# Patient Record
Sex: MALE | Race: WHITE | NOT HISPANIC OR LATINO | Employment: OTHER | URBAN - METROPOLITAN AREA
[De-identification: names, ages, dates, MRNs, and addresses within clinical notes are randomized per-mention and may not be internally consistent; named-entity substitution may affect disease eponyms.]

---

## 2017-01-16 RX ORDER — FUROSEMIDE 20 MG/1
TABLET ORAL
Qty: 90 TAB | Refills: 0 | Status: SHIPPED | OUTPATIENT
Start: 2017-01-16 | End: 2017-04-24 | Stop reason: SDUPTHER

## 2017-01-16 NOTE — TELEPHONE ENCOUNTER
Was the patient seen in the last year in this department? Yes     Does patient have an active prescription for medications requested? No     Received Request Via: Pharmacy      Pt met protocol?: Yes  OV 12/27/16  BP Readings from Last 1 Encounters:   12/27/16 128/40

## 2017-01-30 ENCOUNTER — HOSPITAL ENCOUNTER (OUTPATIENT)
Dept: RADIOLOGY | Facility: MEDICAL CENTER | Age: 82
End: 2017-01-30
Attending: INTERNAL MEDICINE
Payer: MEDICARE

## 2017-01-30 DIAGNOSIS — R07.9 CHEST PAIN, UNSPECIFIED: ICD-10-CM

## 2017-01-30 PROCEDURE — 700111 HCHG RX REV CODE 636 W/ 250 OVERRIDE (IP)

## 2017-01-30 PROCEDURE — A9502 TC99M TETROFOSMIN: HCPCS

## 2017-01-30 RX ORDER — REGADENOSON 0.08 MG/ML
INJECTION, SOLUTION INTRAVENOUS
Status: COMPLETED
Start: 2017-01-30 | End: 2017-01-30

## 2017-01-30 RX ADMIN — REGADENOSON 0.4 MG: 0.08 INJECTION, SOLUTION INTRAVENOUS at 09:09

## 2017-02-06 DIAGNOSIS — I10 ESSENTIAL HYPERTENSION, BENIGN: Chronic | ICD-10-CM

## 2017-02-06 RX ORDER — AMLODIPINE BESYLATE 2.5 MG/1
TABLET ORAL
Qty: 90 TAB | Refills: 1 | Status: SHIPPED | OUTPATIENT
Start: 2017-02-06 | End: 2017-08-02 | Stop reason: SDUPTHER

## 2017-02-06 NOTE — TELEPHONE ENCOUNTER
Refill X 6 months, sent to pharmacy.Pt. Seen in the last 6 months per protocol.   Lab Results   Component Value Date/Time    SODIUM 138 10/20/2016 09:54 AM    SODIUM 140 10/20/2016 09:54 AM    POTASSIUM 4.8 10/20/2016 09:54 AM    POTASSIUM 4.8 10/20/2016 09:54 AM    CHLORIDE 108 10/20/2016 09:54 AM    CHLORIDE 107 10/20/2016 09:54 AM    CO2 25 10/20/2016 09:54 AM    CO2 26 10/20/2016 09:54 AM    GLUCOSE 118* 10/20/2016 09:54 AM    GLUCOSE 119* 10/20/2016 09:54 AM    BUN 22 10/20/2016 09:54 AM    BUN 23* 10/20/2016 09:54 AM    CREATININE 1.77* 10/20/2016 09:54 AM    CREATININE 1.75* 10/20/2016 09:54 AM

## 2017-02-14 RX ORDER — VALSARTAN 160 MG/1
TABLET ORAL
Qty: 90 TAB | Refills: 0 | Status: SHIPPED | OUTPATIENT
Start: 2017-02-14 | End: 2017-05-18 | Stop reason: SDUPTHER

## 2017-02-14 NOTE — TELEPHONE ENCOUNTER
Was the patient seen in the last year in this department? Yes     Does patient have an active prescription for medications requested? No     Received Request Via: Pharmacy      Pt met protocol?: Yes, LABS 10/16, OV 12/16 /40

## 2017-02-14 NOTE — TELEPHONE ENCOUNTER
Refill X 3 months, sent to pharmacy.Pt. Seen in the last 6 months per protocol.   Lab Results   Component Value Date/Time    SODIUM 138 10/20/2016 09:54 AM    SODIUM 140 10/20/2016 09:54 AM    POTASSIUM 4.8 10/20/2016 09:54 AM    POTASSIUM 4.8 10/20/2016 09:54 AM    CHLORIDE 108 10/20/2016 09:54 AM    CHLORIDE 107 10/20/2016 09:54 AM    CO2 25 10/20/2016 09:54 AM    CO2 26 10/20/2016 09:54 AM    GLUCOSE 118* 10/20/2016 09:54 AM    GLUCOSE 119* 10/20/2016 09:54 AM    BUN 22 10/20/2016 09:54 AM    BUN 23* 10/20/2016 09:54 AM    CREATININE 1.77* 10/20/2016 09:54 AM    CREATININE 1.75* 10/20/2016 09:54 AM

## 2017-03-07 RX ORDER — PRAVASTATIN SODIUM 10 MG
TABLET ORAL
Qty: 90 TAB | Refills: 1 | Status: SHIPPED | OUTPATIENT
Start: 2017-03-07 | End: 2017-09-05 | Stop reason: SDUPTHER

## 2017-03-07 NOTE — TELEPHONE ENCOUNTER
Last seen by PCP 12/16. Will send 6 months to pharmacy.  Lab Results   Component Value Date/Time    CHOLESTEROL, 10/20/2016 09:54 AM    CHOLESTEROL, 10/20/2016 09:54 AM    LDL 60 10/20/2016 09:54 AM    LDL 60 10/20/2016 09:54 AM    HDL 42 10/20/2016 09:54 AM    HDL 41 10/20/2016 09:54 AM    TRIGLYCERIDES 162* 10/20/2016 09:54 AM    TRIGLYCERIDES 163* 10/20/2016 09:54 AM       Lab Results   Component Value Date/Time    SODIUM 138 10/20/2016 09:54 AM    SODIUM 140 10/20/2016 09:54 AM    POTASSIUM 4.8 10/20/2016 09:54 AM    POTASSIUM 4.8 10/20/2016 09:54 AM    CHLORIDE 108 10/20/2016 09:54 AM    CHLORIDE 107 10/20/2016 09:54 AM    CO2 25 10/20/2016 09:54 AM    CO2 26 10/20/2016 09:54 AM    GLUCOSE 118* 10/20/2016 09:54 AM    GLUCOSE 119* 10/20/2016 09:54 AM    BUN 22 10/20/2016 09:54 AM    BUN 23* 10/20/2016 09:54 AM    CREATININE 1.77* 10/20/2016 09:54 AM    CREATININE 1.75* 10/20/2016 09:54 AM     Lab Results   Component Value Date/Time    ALKALINE PHOSPHATASE 52 10/20/2016 09:54 AM    ALKALINE PHOSPHATASE 54 10/20/2016 09:54 AM    AST(SGOT) 21 10/20/2016 09:54 AM    AST(SGOT) 19 10/20/2016 09:54 AM    ALT(SGPT) 21 10/20/2016 09:54 AM    ALT(SGPT) 20 10/20/2016 09:54 AM    TOTAL BILIRUBIN 0.6 10/20/2016 09:54 AM    TOTAL BILIRUBIN 0.6 10/20/2016 09:54 AM

## 2017-03-22 ENCOUNTER — PATIENT OUTREACH (OUTPATIENT)
Dept: HEALTH INFORMATION MANAGEMENT | Facility: OTHER | Age: 82
End: 2017-03-22

## 2017-03-22 NOTE — PROGRESS NOTES
3/22/17  -   Attempt #:1    Verify PCP: yes    Communication Preference Obtained: no     Annual Wellness Visit Scheduling  1. Scheduling Status:Scheduled     Care Gap Scheduling (Attempt to Schedule EACH Overdue Care Gap!)     Health Maintenance Due   Topic Date Due   • Annual Wellness Visit  Scheduled   • IMM DTaP/Tdap/Td Vaccine (1 - Tdap) Unable to discuss Immunizations   • IMM ZOSTER VACCINE     • IMM PNEUMOCOCCAL 65+ (ADULT) LOW/MEDIUM RISK SERIES (2 of 2 - PPSV23)          MyChart Activation: declined  Unable to complete new member, pt and wife busy.

## 2017-04-10 ENCOUNTER — TELEPHONE (OUTPATIENT)
Dept: MEDICAL GROUP | Facility: PHYSICIAN GROUP | Age: 82
End: 2017-04-10

## 2017-04-10 NOTE — TELEPHONE ENCOUNTER
1. Caller Name: Rajwinder/wife                                         Call Back Number: [phone] (home)         Patient approves a detailed voicemail message: N\A    Rajwinder states she found a scooter at [facility] that is mobil and foldable.  She is asking for an order she can  and take to [facility].

## 2017-04-12 NOTE — TELEPHONE ENCOUNTER
The scooters typically require a mobility assessment for the insurance to pay for it.  This can be done through OT.  I don't see that this was done.  Does the family want to start that process?

## 2017-04-19 ENCOUNTER — OFFICE VISIT (OUTPATIENT)
Dept: MEDICAL GROUP | Facility: PHYSICIAN GROUP | Age: 82
End: 2017-04-19
Payer: MEDICARE

## 2017-04-19 VITALS
TEMPERATURE: 98.4 F | HEIGHT: 70 IN | HEART RATE: 58 BPM | OXYGEN SATURATION: 96 % | BODY MASS INDEX: 31.5 KG/M2 | SYSTOLIC BLOOD PRESSURE: 120 MMHG | RESPIRATION RATE: 16 BRPM | DIASTOLIC BLOOD PRESSURE: 68 MMHG | WEIGHT: 220 LBS

## 2017-04-19 DIAGNOSIS — C44.319 BASAL CELL CARCINOMA OF BROW: ICD-10-CM

## 2017-04-19 DIAGNOSIS — I10 ESSENTIAL HYPERTENSION, BENIGN: Chronic | ICD-10-CM

## 2017-04-19 DIAGNOSIS — N18.30 CKD (CHRONIC KIDNEY DISEASE) STAGE 3, GFR 30-59 ML/MIN (HCC): ICD-10-CM

## 2017-04-19 DIAGNOSIS — E78.5 HYPERLIPIDEMIA, UNSPECIFIED HYPERLIPIDEMIA TYPE: Chronic | ICD-10-CM

## 2017-04-19 DIAGNOSIS — F10.10 EXCESSIVE DRINKING ALCOHOL: ICD-10-CM

## 2017-04-19 DIAGNOSIS — D53.9 MACROCYTIC ANEMIA: ICD-10-CM

## 2017-04-19 DIAGNOSIS — R79.89 ELEVATED LFTS: ICD-10-CM

## 2017-04-19 PROCEDURE — 99999 PR NO CHARGE: CPT | Performed by: NURSE PRACTITIONER

## 2017-04-19 PROCEDURE — G8432 DEP SCR NOT DOC, RNG: HCPCS | Performed by: NURSE PRACTITIONER

## 2017-04-19 PROCEDURE — 1036F TOBACCO NON-USER: CPT | Performed by: NURSE PRACTITIONER

## 2017-04-19 PROCEDURE — G0439 PPPS, SUBSEQ VISIT: HCPCS | Performed by: NURSE PRACTITIONER

## 2017-04-19 ASSESSMENT — PAIN SCALES - GENERAL: PAINLEVEL: 1=MINIMAL PAIN

## 2017-04-19 ASSESSMENT — PATIENT HEALTH QUESTIONNAIRE - PHQ9: CLINICAL INTERPRETATION OF PHQ2 SCORE: 0

## 2017-04-19 NOTE — MR AVS SNAPSHOT
"        Lamont Lynch   2017 10:00 AM   Office Visit   MRN: [id number]    Department:  [facility]   Dept Phone:  [phone]    Description:  Male : 1930   Provider:  MELISSA Chan; [facility]            Reason for Visit     Annual Wellness Visit           Allergies as of 2017     Allergen Noted Reactions    Nkda [No Known Drug Allergy] 10/25/2012         You were diagnosed with     BMI 31.0-31.9,adult   [198423]       CKD (chronic kidney disease) stage 3, GFR 30-59 ml/min   [770408]       Essential hypertension, benign   [401.1.ICD-9-CM]       Elevated LFTs   [345554]       Excessive drinking alcohol   [600720]       Hyperlipidemia, unspecified hyperlipidemia type   [7210727]       Macrocytic anemia   [462456]       Basal cell carcinoma of brow   [441182]         Vital Signs     Blood Pressure Pulse Temperature Respirations Height Weight    120/68 mmHg 58 36.9 °C (98.4 °F) 16 1.778 m (5' 10\") 99.791 kg (220 lb)    Body Mass Index Oxygen Saturation Smoking Status             31.57 kg/m2 96% Former Smoker         Basic Information     Date Of Birth Sex Race Ethnicity Preferred Language    1930 Male White Non- English      Your appointments     2017 10:40 AM   Established Patient with MELISSA Chan   [address] [id number] [phone]           You will be receiving a confirmation call a few days before your appointment from our automated call confirmation system.              Problem List              ICD-10-CM Priority Class Noted - Resolved    CAD (coronary artery disease) I25.10 High  2011 - Present    Essential hypertension, benign (Chronic) I10 Medium  2011 - Present    CKD (chronic kidney disease) stage 3, GFR 30-59 ml/min N18.3 High  2011 - Present    Hyperlipidemia (Chronic) E78.5 Medium  2011 - Present    Elevated LFTs " R79.89 High  12/30/2013 - Present    Excessive drinking alcohol F10.10 High  12/30/2013 - Present    Basal cell carcinoma of brow C44.319 Medium Chronic 9/2/2014 - Present    BMI 31.0-31.9,adult Z68.31   2/25/2015 - Present    Macrocytic anemia D53.9   4/22/2015 - Present      Health Maintenance        Date Due Completion Dates    IMM PNEUMOCOCCAL 65+ (ADULT) LOW/MEDIUM RISK SERIES (2 of 2 - PPSV23) 8/1/2017 (Originally 3/1/2017) 3/1/2016    IMM ZOSTER VACCINE 5/1/2018 (Originally 4/6/1990) ---    IMM DTaP/Tdap/Td Vaccine (1 - Tdap) 5/14/2018 (Originally [birth year]1949) ---    COLONOSCOPY 11/1/2020 11/1/2010 (N/S)    Override on 11/1/2010: (N/S)            Current Immunizations     13-VALENT PCV PREVNAR 3/1/2016    Influenza Vaccine Adult HD 12/27/2016    Influenza Vaccine Quad Inj (Preserved) 1/8/2015  3:06 PM      Below and/or attached are the medications your provider expects you to take. Review all of your home medications and newly ordered medications with your provider and/or pharmacist. Follow medication instructions as directed by your provider and/or pharmacist. Please keep your medication list with you and share with your provider. Update the information when medications are discontinued, doses are changed, or new medications (including over-the-counter products) are added; and carry medication information at all times in the event of emergency situations     Allergies:  NKDA - (reactions not documented)               Medications  Valid as of: April 19, 2017 - 11:34 AM    Generic Name Brand Name Tablet Size Instructions for use    AmLODIPine Besylate (Tab) NORVASC 2.5 MG TAKE ONE TABLET BY MOUTH ONCE DAILY        Clopidogrel Bisulfate (Tab) PLAVIX 75 MG TAKE ONE TABLET BY MOUTH ONCE DAILY        Ezetimibe (Tab) ZETIA 10 MG TAKE ONE TABLET BY MOUTH ONCE DAILY        Furosemide (Tab) LASIX 20 MG TAKE ONE TABLET BY MOUTH ONCE DAILY        Homeopathic Products   by Ophthalmic route 3 times a day.        Multiple  Vitamins-Minerals   Take  by mouth every day.        Nitroglycerin (SL Tab) NITROSTAT 0.4 MG Place 0.4 mg under tongue every 5 minutes as needed.        Non Formulary Request Non Formulary Request  Systane eye gtts, besivance eye gtts, tradnisolone eye gtts, ketorolac eye gtts two to three times a day        Omega-3 Fatty Acids (Cap) OMEGA 3 FA 1000 MG Take 1,000 mg by mouth 2 Times a Day.        Omeprazole (CAPSULE DELAYED RELEASE) PRILOSEC 20 MG One 1/2 hr before dinner        Pravastatin Sodium (Tab) PRAVACHOL 10 MG TAKE ONE TABLET BY MOUTH ONCE DAILY        Triamcinolone Acetonide (Cream) KENALOG 0.1 % Apply  Topically to affected area twice daily        Valsartan (Tab) DIOVAN 160 MG TAKE ONE TABLET BY MOUTH ONCE DAILY IN THE MORNING        .                 Medicines prescribed today were sent to:     [facility] [address] [place] [id number]    Phone: [phone] Fax: [phone]    Open 24 Hours?: No      Medication refill instructions:       If your prescription bottle indicates you have medication refills left, it is not necessary to call your provider’s office. Please contact your pharmacy and they will refill your medication.    If your prescription bottle indicates you do not have any refills left, you may request refills at any time through one of the following ways: The online [employer] system (except Urgent Care), by calling your provider’s office, or by asking your pharmacy to contact your provider’s office with a refill request. Medication refills are processed only during regular business hours and may not be available until the next business day. Your provider may request additional information or to have a follow-up visit with you prior to refilling your medication.   *Please Note: Medication refills are assigned a new Rx number when refilled electronically. Your pharmacy may indicate that no refills were authorized even though a new  prescription for the same medication is available at the pharmacy. Please request the medicine by name with the pharmacy before contacting your provider for a refill.        Other Notes About Your Plan     Pt prefers to be called Alan Herrera Status: Patient Declined

## 2017-04-19 NOTE — PROGRESS NOTES
Chief Complaint   Patient presents with   • Annual Wellness Visit         HPI:  Lamont is a 87 y.o. male here for Medicare Annual Wellness Visit        Patient Active Problem List    Diagnosis Date Noted   • Elevated LFTs 12/30/2013     Priority: High   • Excessive drinking alcohol 12/30/2013     Priority: High   • CAD (coronary artery disease) 06/02/2011     Priority: High   • CKD (chronic kidney disease) stage 3, GFR 30-59 ml/min 06/02/2011     Priority: High   • Basal cell carcinoma of brow 09/02/2014     Priority: Medium     Class: Chronic   • Hyperlipidemia 12/01/2011     Priority: Medium   • Essential hypertension, benign 06/02/2011     Priority: Medium   • Macrocytic anemia 04/22/2015   • BMI 31.0-31.9,adult 02/25/2015       Current Outpatient Prescriptions   Medication Sig Dispense Refill   • pravastatin (PRAVACHOL) 10 MG Tab TAKE ONE TABLET BY MOUTH ONCE DAILY 90 Tab 1   • valsartan (DIOVAN) 160 MG Tab TAKE ONE TABLET BY MOUTH ONCE DAILY IN THE MORNING 90 Tab 0   • amlodipine (NORVASC) 2.5 MG Tab TAKE ONE TABLET BY MOUTH ONCE DAILY 90 Tab 1   • furosemide (LASIX) 20 MG Tab TAKE ONE TABLET BY MOUTH ONCE DAILY 90 Tab 0   • triamcinolone acetonide (KENALOG) 0.1 % Cream Apply  Topically to affected area twice daily 1 Tube 2   • ezetimibe (ZETIA) 10 MG Tab TAKE ONE TABLET BY MOUTH ONCE DAILY 90 Tab 1   • clopidogrel (PLAVIX) 75 MG Tab TAKE ONE TABLET BY MOUTH ONCE DAILY 90 Tab 1   • Non Formulary Request Systane eye gtts, besivance eye gtts, tradnisolone eye gtts, ketorolac eye gtts two to three times a day     • nitroglycerin (NITROSTAT) 0.4 MG SUBL Place 0.4 mg under tongue every 5 minutes as needed.     • omeprazole (PRILOSEC) 20 MG CPDR One 1/2 hr before dinner 30 Cap 0   • Multiple Vitamins-Minerals (CENTRUM SILVER PO) Take  by mouth every day.     • docosahexanoic acid (FISH OIL) 1000 MG CAPS Take 1,000 mg by mouth 2 Times a Day.     • Homeopathic Products (SIMILASAN ALLERGY EYE RELIEF OP) by Ophthalmic  route 3 times a day.       No current facility-administered medications for this visit.        Patient is taking medications as noted in medication list.  Current supplements as per medication list.   Chronic narcotic pain medicines: no    Allergies: Nkda    Current social contact/activities: spend time with family     Is patient current with immunizations?  No, due for pneumonia. Patient is interested in receiving NONE today.     He  reports that he quit smoking about 44 years ago. His smoking use included Cigarettes. He has a 10 pack-year smoking history. He has never used smokeless tobacco. He reports that he does not drink alcohol or use illicit drugs.  Counseling given: Not Answered        DPA/Advanced Directive:  Patient has Advanced Directive, but it is not on file. Instructed to bring in a copy to scan into their chart.    ROS:    Gait: Uses a cane   Ostomy: no   Other tubes: no   Amputations: no   Chronic oxygen use no   Last eye exam 4/2015   Wears hearing aids: no   : Denies incontinence.       Depression Screening    Little interest or pleasure in doing things?  0 - not at all  Feeling down, depressed, or hopeless?  0 - not at all  Patient Health Questionnaire Score: 0  If depressive symptoms identified deferred to follow up visit unless specifically addressed in assessment and plan.    Screening for Cognitive Impairment    Three Minute Recall (banana, sunrise, fence)  2/3    Draw clock face with all 12 numbers set to the hand to show 10 minutes past 11 o'clock    5/5  If cognitive concerns identified deferred to follow up visit unless specifically addressed in assessment and plan.    Fall Risk Assessment    Has the patient had two or more falls in the last year or any fall with injury in the last year?  No  If Fall Risk identified deferred to follow up visit unless specifically addressed in assessment and plan.    Safety Assessment    Throw rugs on floor.  Yes  Handrails on all stairs.  Yes  Good  lighting in all hallways.  Yes  Difficulty hearing.  Yes  Patient counseled about all safety risks that were identified.    Functional Assessment ADLs    Are there any barriers preventing you from cooking for yourself or meeting nutritional needs?  No.    Are there any barriers preventing you from driving safely or obtaining transportation?  No.    Are there any barriers preventing you from using a telephone or calling for help?  No.    Are there any barriers preventing you from shopping?  No.    Are there any barriers preventing you from taking care of your own finances?  No.    Are there any barriers preventing you from managing your medications?  No.    Are currently engaging any exercise or physical activity?  No.       Health Maintenance Summary                Annual Wellness Visit Overdue 4/6/1930     IMM PNEUMOCOCCAL 65+ (ADULT) LOW/MEDIUM RISK SERIES Postponed 8/1/2017 Originally 3/1/2017. Patient Refused     Done 3/1/2016 Imm Admin: Pneumococcal Conjugate Vaccine (Prevnar/PCV-13)    IMM ZOSTER VACCINE Postponed 5/1/2018 Originally 4/6/1990. Patient Refused    IMM DTaP/Tdap/Td Vaccine Postponed 5/14/2018 Originally 4/6/1949. Patient Refused    COLONOSCOPY Next Due 11/1/2020      Not specified 11/1/2010           Patient Care Team:  MELISSA Chan as PCP - General (Family Medicine)  Fadi Najjar, M.D. as Consulting Physician (Nephrology)  Fei Zapata M.D. as Consulting Physician (Ophthalmology)  Rao Mendes M.D. as Consulting Physician (Cardiology)  Aliya Mcdowell M.D. as Consulting Physician (Dermatology)    Social History   Substance Use Topics   • Smoking status: Former Smoker -- 0.50 packs/day for 20 years     Types: Cigarettes     Quit date: 10/25/1972   • Smokeless tobacco: Never Used   • Alcohol Use: No      Comment: quit 5/2015     History reviewed. No pertinent family history.  He  has a past medical history of CAD (coronary artery disease); Stented coronary artery; and  "BMI 31.0-31.9,adult (2/25/2015).   Past Surgical History   Procedure Laterality Date   • Cataract phaco with iol  8/26/2014     Performed by Fei Zapata M.D. at [facility]   • Cataract phaco with iol  9/23/2014     Performed by Fei Zapata M.D. at [facility]   • Angioplasty balloon     • Eye surgery     • Appendectomy             Exam:     Blood pressure 120/68, pulse 58, temperature 36.9 °C (98.4 °F), resp. rate 16, height 1.778 m (5' 10\"), weight 99.791 kg (220 lb), SpO2 96 %. Body mass index is 31.57 kg/(m^2).    Hearing fair.    Dentition multiple carries  Alert, oriented in no acute distress.  Eye contact is good, speech goal directed, affect calm      Assessment and Plan. The following treatment and monitoring plan is recommended:    1. BMI 31.0-31.9,adult     2. CKD (chronic kidney disease) stage 3, GFR 30-59 ml/min     3. Essential hypertension, benign     4. Elevated LFTs     5. Excessive drinking alcohol     6. Hyperlipidemia, unspecified hyperlipidemia type     7. Macrocytic anemia     8. Basal cell carcinoma of brow           Services suggested: No services needed at this time  Health Care Screening recommendations as per orders if indicated.  Referrals offered: PT/OT/Nutrition counseling/Behavioral Health/Smoking cessation as per orders if indicated.    Discussion today about general wellness and lifestyle habits:    · Prevent falls and reduce trip hazards; Cautioned about securing or removing rugs.  · Have a working fire alarm and carbon monoxide detector;   · Engage in regular physical activity and social activities       Follow-up: Return in about 1 week (around 4/26/2017) for HTN/Lipid.  "

## 2017-04-25 RX ORDER — FUROSEMIDE 20 MG/1
TABLET ORAL
Qty: 90 TAB | Refills: 0 | Status: SHIPPED | OUTPATIENT
Start: 2017-04-25 | End: 2017-08-02 | Stop reason: SDUPTHER

## 2017-04-25 NOTE — TELEPHONE ENCOUNTER
Was the patient seen in the last year in this department? Yes     Does patient have an active prescription for medications requested? No     Received Request Via: Pharmacy      Pt met protocol?: Yes    LAST OV 04/19/2017

## 2017-04-26 ENCOUNTER — OFFICE VISIT (OUTPATIENT)
Dept: MEDICAL GROUP | Facility: PHYSICIAN GROUP | Age: 82
End: 2017-04-26
Payer: MEDICARE

## 2017-04-26 VITALS
WEIGHT: 221.6 LBS | BODY MASS INDEX: 31.73 KG/M2 | TEMPERATURE: 98.6 F | RESPIRATION RATE: 16 BRPM | OXYGEN SATURATION: 98 % | DIASTOLIC BLOOD PRESSURE: 56 MMHG | SYSTOLIC BLOOD PRESSURE: 126 MMHG | HEART RATE: 56 BPM | HEIGHT: 70 IN

## 2017-04-26 DIAGNOSIS — R05.3 CHRONIC COUGH: ICD-10-CM

## 2017-04-26 PROCEDURE — 99213 OFFICE O/P EST LOW 20 MIN: CPT | Performed by: NURSE PRACTITIONER

## 2017-04-26 PROCEDURE — G8419 CALC BMI OUT NRM PARAM NOF/U: HCPCS | Performed by: NURSE PRACTITIONER

## 2017-04-26 PROCEDURE — 1101F PT FALLS ASSESS-DOCD LE1/YR: CPT | Performed by: NURSE PRACTITIONER

## 2017-04-26 PROCEDURE — G8432 DEP SCR NOT DOC, RNG: HCPCS | Performed by: NURSE PRACTITIONER

## 2017-04-26 PROCEDURE — 1036F TOBACCO NON-USER: CPT | Performed by: NURSE PRACTITIONER

## 2017-04-26 PROCEDURE — 4040F PNEUMOC VAC/ADMIN/RCVD: CPT | Performed by: NURSE PRACTITIONER

## 2017-04-26 PROCEDURE — G8598 ASA/ANTIPLAT THER USED: HCPCS | Performed by: NURSE PRACTITIONER

## 2017-04-26 NOTE — ASSESSMENT & PLAN NOTE
Patient reports a chronic cough for several years. He is now experiencing a choking causing intermittent difficulty breathing. Choking can occur even when he is not eating or drinking, but he denies difficulty swallowing. He denies any fever or chills, ear pain or shortness.   Associated factors: environmental allergies with postnasal drip and has not started taking daily allergy medication.

## 2017-04-26 NOTE — MR AVS SNAPSHOT
"        Lamont Lynch   2017 10:40 AM   Office Visit   MRN: [id number]    Department:  [facility]   Dept Phone:  [phone]    Description:  Male : 1930   Provider:  MELISSA Chan           Reason for Visit     Cough w/ mucas       Allergies as of 2017     Allergen Noted Reactions    Nkda [No Known Drug Allergy] 10/25/2012         You were diagnosed with     Chronic cough   [304639]         Vital Signs     Blood Pressure Pulse Temperature Respirations Height Weight    126/56 mmHg 56 37 °C (98.6 °F) 16 1.778 m (5' 10\") 100.517 kg (221 lb 9.6 oz)    Body Mass Index Oxygen Saturation Smoking Status             31.80 kg/m2 98% Former Smoker         Basic Information     Date Of Birth Sex Race Ethnicity Preferred Language    1930 Male White Non- English      Problem List              ICD-10-CM Priority Class Noted - Resolved    CAD (coronary artery disease) I25.10 High  2011 - Present    Essential hypertension, benign (Chronic) I10 Medium  2011 - Present    CKD (chronic kidney disease) stage 3, GFR 30-59 ml/min N18.3 High  2011 - Present    Hyperlipidemia (Chronic) E78.5 Medium  2011 - Present    Elevated LFTs R79.89 High  2013 - Present    Excessive drinking alcohol F10.10 High  2013 - Present    Basal cell carcinoma of brow C44.319 Medium Chronic 2014 - Present    BMI 31.0-31.9,adult Z68.31   2015 - Present    Macrocytic anemia D53.9   2015 - Present    Chronic cough R05   2017 - Present      Health Maintenance        Date Due Completion Dates    IMM PNEUMOCOCCAL 65+ (ADULT) LOW/MEDIUM RISK SERIES (2 of 2 - PPSV23) 2017 (Originally 3/1/2017) 3/1/2016    IMM ZOSTER VACCINE 2018 (Originally 1990) ---    IMM DTaP/Tdap/Td Vaccine (1 - Tdap) 2018 (Originally 1949) ---    COLONOSCOPY 2020 (N/S)    Override on 2010: (N/S)            Current Immunizations     13-VALENT PCV " PREVNAR 3/1/2016    Influenza Vaccine Adult HD 12/27/2016    Influenza Vaccine Quad Inj (Preserved) 1/8/2015  3:06 PM      Below and/or attached are the medications your provider expects you to take. Review all of your home medications and newly ordered medications with your provider and/or pharmacist. Follow medication instructions as directed by your provider and/or pharmacist. Please keep your medication list with you and share with your provider. Update the information when medications are discontinued, doses are changed, or new medications (including over-the-counter products) are added; and carry medication information at all times in the event of emergency situations     Allergies:  NKDA - (reactions not documented)               Medications  Valid as of: April 26, 2017 - 11:07 AM    Generic Name Brand Name Tablet Size Instructions for use    AmLODIPine Besylate (Tab) NORVASC 2.5 MG TAKE ONE TABLET BY MOUTH ONCE DAILY        Clopidogrel Bisulfate (Tab) PLAVIX 75 MG TAKE ONE TABLET BY MOUTH ONCE DAILY        Ezetimibe (Tab) ZETIA 10 MG TAKE ONE TABLET BY MOUTH ONCE DAILY        Furosemide (Tab) LASIX 20 MG TAKE ONE TABLET BY MOUTH ONCE DAILY        Homeopathic Products   by Ophthalmic route 3 times a day.        Multiple Vitamins-Minerals   Take  by mouth every day.        Nitroglycerin (SL Tab) NITROSTAT 0.4 MG Place 0.4 mg under tongue every 5 minutes as needed.        Non Formulary Request Non Formulary Request  Systane eye gtts, besivance eye gtts, tradnisolone eye gtts, ketorolac eye gtts two to three times a day        Omega-3 Fatty Acids (Cap) OMEGA 3 FA 1000 MG Take 1,000 mg by mouth 2 Times a Day.        Omeprazole (CAPSULE DELAYED RELEASE) PRILOSEC 20 MG One 1/2 hr before dinner        Pravastatin Sodium (Tab) PRAVACHOL 10 MG TAKE ONE TABLET BY MOUTH ONCE DAILY        Triamcinolone Acetonide (Cream) KENALOG 0.1 % Apply  Topically to affected area twice daily        Valsartan (Tab) DIOVAN 160 MG TAKE  ONE TABLET BY MOUTH ONCE DAILY IN THE MORNING        .                 Medicines prescribed today were sent to:     [facility] [address] [place] [address], [place] - [address] [facility]    [address] [facility] [place] [address]    Phone: [phone] Fax: [phone]    Open 24 Hours?: No      Medication refill instructions:       If your prescription bottle indicates you have medication refills left, it is not necessary to call your provider’s office. Please contact your pharmacy and they will refill your medication.    If your prescription bottle indicates you do not have any refills left, you may request refills at any time through one of the following ways: The online Bulbstorm system (except Urgent Care), by calling your provider’s office, or by asking your pharmacy to contact your provider’s office with a refill request. Medication refills are processed only during regular business hours and may not be available until the next business day. Your provider may request additional information or to have a follow-up visit with you prior to refilling your medication.   *Please Note: Medication refills are assigned a new Rx number when refilled electronically. Your pharmacy may indicate that no refills were authorized even though a new prescription for the same medication is available at the pharmacy. Please request the medicine by name with the pharmacy before contacting your provider for a refill.        Referral     A referral request has been sent to our patient care coordination department. Please allow 3-5 business days for us to process this request and contact you either by phone or mail. If you do not hear from us by the 5th business day, please call us at [phone].        Other Notes About Your Plan     Pt prefers to be called Alan           [employer] Access Code: [email or web address]  Expires: 5/26/2017 11:07 AM    Bulbstorm  A secure, online tool to manage your health information     [employer]’s Bulbstorm® is a  secure, online tool that connects you to your personalized health information from the privacy of your home -- day or night - making it very easy for you to manage your healthcare. Once the activation process is completed, you can even access your medical information using the Jiberish leidy, which is available for free in the Apple Leidy store or Google Play store.     Jiberish provides the following levels of access (as shown below):   My Chart Features   [facility] Doctor [facility]  Specialists [facility]  Urgent  Care Non-[facility]  Primary Care  Doctor   Email your healthcare team securely and privately 24/7 X X X    Manage appointments: schedule your next appointment; view details of past/upcoming appointments X      Request prescription refills. X      View recent personal medical records, including lab and immunizations X X X X   View health record, including health history, allergies, medications X X X X   Read reports about your outpatient visits, procedures, consult and ER notes X X X X   See your discharge summary, which is a recap of your hospital and/or ER visit that includes your diagnosis, lab results, and care plan. X X       How to register for Jiberish:  1. Go to  [email or web address].  2. Click on the Sign Up Now box, which takes you to the New Member Sign Up page. You will need to provide the following information:  a. Enter your Jiberish Access Code exactly as it appears at the top of this page. (You will not need to use this code after you’ve completed the sign-up process. If you do not sign up before the expiration date, you must request a new code.)   b. Enter your date of birth.   c. Enter your home email address.   d. Click Submit, and follow the next screen’s instructions.  3. Create a YouLiket ID. This will be your Jiberish login ID and cannot be changed, so think of one that is secure and easy to remember.  4. Create a Jiberish password. You can change your password at any time.  5. Enter  your Password Reset Question and Answer. This can be used at a later time if you forget your password.   6. Enter your e-mail address. This allows you to receive e-mail notifications when new information is available in [employer].  7. Click Sign Up. You can now view your health information.    For assistance activating your [employer] account, call [phone]

## 2017-04-26 NOTE — PROGRESS NOTES
Subjective:     Chief Complaint   Patient presents with   • Cough     w/ mucas        HPI:  Lamont Lynch is a 87 y.o. male here today to discuss the following:    Chronic cough  Patient reports a chronic cough for several years. He is now experiencing a choking causing intermittent difficulty breathing. Choking can occur even when he is not eating or drinking, but he denies difficulty swallowing. He denies any fever or chills, ear pain or shortness.   Associated factors: environmental allergies with postnasal drip and has not started taking daily allergy medication.        Current medicines (including changes today)  Current Outpatient Prescriptions   Medication Sig Dispense Refill   • furosemide (LASIX) 20 MG Tab TAKE ONE TABLET BY MOUTH ONCE DAILY 90 Tab 0   • pravastatin (PRAVACHOL) 10 MG Tab TAKE ONE TABLET BY MOUTH ONCE DAILY 90 Tab 1   • valsartan (DIOVAN) 160 MG Tab TAKE ONE TABLET BY MOUTH ONCE DAILY IN THE MORNING 90 Tab 0   • amlodipine (NORVASC) 2.5 MG Tab TAKE ONE TABLET BY MOUTH ONCE DAILY 90 Tab 1   • triamcinolone acetonide (KENALOG) 0.1 % Cream Apply  Topically to affected area twice daily 1 Tube 2   • ezetimibe (ZETIA) 10 MG Tab TAKE ONE TABLET BY MOUTH ONCE DAILY 90 Tab 1   • clopidogrel (PLAVIX) 75 MG Tab TAKE ONE TABLET BY MOUTH ONCE DAILY 90 Tab 1   • Non Formulary Request Systane eye gtts, besivance eye gtts, tradnisolone eye gtts, ketorolac eye gtts two to three times a day     • nitroglycerin (NITROSTAT) 0.4 MG SUBL Place 0.4 mg under tongue every 5 minutes as needed.     • omeprazole (PRILOSEC) 20 MG CPDR One 1/2 hr before dinner 30 Cap 0   • Multiple Vitamins-Minerals (CENTRUM SILVER PO) Take  by mouth every day.     • docosahexanoic acid (FISH OIL) 1000 MG CAPS Take 1,000 mg by mouth 2 Times a Day.     • Homeopathic Products (SIMILASAN ALLERGY EYE RELIEF OP) by Ophthalmic route 3 times a day.       No current facility-administered medications for this visit.       He  has a  "past medical history of CAD (coronary artery disease); Stented coronary artery; and BMI 31.0-31.9,adult (2/25/2015).    ROS   Review of Systems   Constitutional: Negative for fever, chills, weight loss and malaise/fatigue.   HENT: Negative for ear pain, nosebleeds, congestion, sore throat and neck pain.    Respiratory: Negative for  sputum production, shortness of breath and wheezing.  Positive for cough  Cardiovascular: Negative for chest pain, palpitations,  and leg swelling.   Gastrointestinal: Negative for heartburn, nausea, vomiting, diarrhea and abdominal pain.   Neurological: Negative for dizziness, tingling, tremors, sensory change, focal weakness and headaches.   Psychiatric/Behavioral: Negative for depression, anxiety, suicidal ideas, insomnia and memory loss.    All other systems reviewed and are negative except as in HPI.     Objective:   Physical Exam:  Blood pressure 126/56, pulse 56, temperature 37 °C (98.6 °F), resp. rate 16, height 1.778 m (5' 10\"), weight 100.517 kg (221 lb 9.6 oz), SpO2 98 %. Body mass index is 31.8 kg/(m^2).   Physical Exam:  Alert, oriented in no acute distress.  Eye contact is good, speech goal directed, affect calm  HEENT: conjunctiva non-injected, sclera non-icteric.  Pinna normal. Oropharynx is clear with clear postnasal drip. Nasal passages are patent with clear rhinorrhea.  Neck No adenopathy or masses in the neck or supraclavicular regions.  Lungs: clear to auscultation bilaterally with good excursion.  CV: regular rate and rhythm.   MS: Normal gait and station    Assessment and Plan:   The following treatment plan was discussed   1. Chronic cough  REFERRAL TO GASTROENTEROLOGY       Followup: Return if symptoms worsen or fail to improve.   Please note that this dictation was created using voice recognition software. I have made every reasonable attempt to correct obvious errors, but I expect that there are errors of grammar and possibly content that I did not discover " before finalizing the note.

## 2017-05-10 ENCOUNTER — OFFICE VISIT (OUTPATIENT)
Dept: MEDICAL GROUP | Facility: PHYSICIAN GROUP | Age: 82
End: 2017-05-10
Payer: MEDICARE

## 2017-05-10 VITALS
BODY MASS INDEX: 31.58 KG/M2 | WEIGHT: 220.6 LBS | OXYGEN SATURATION: 97 % | TEMPERATURE: 97.5 F | SYSTOLIC BLOOD PRESSURE: 124 MMHG | HEIGHT: 70 IN | DIASTOLIC BLOOD PRESSURE: 72 MMHG | HEART RATE: 65 BPM | RESPIRATION RATE: 14 BRPM

## 2017-05-10 DIAGNOSIS — Z74.09 IMPAIRED MOBILITY: ICD-10-CM

## 2017-05-10 PROCEDURE — 4040F PNEUMOC VAC/ADMIN/RCVD: CPT | Performed by: NURSE PRACTITIONER

## 2017-05-10 PROCEDURE — G8432 DEP SCR NOT DOC, RNG: HCPCS | Performed by: NURSE PRACTITIONER

## 2017-05-10 PROCEDURE — G8419 CALC BMI OUT NRM PARAM NOF/U: HCPCS | Performed by: NURSE PRACTITIONER

## 2017-05-10 PROCEDURE — 99214 OFFICE O/P EST MOD 30 MIN: CPT | Performed by: NURSE PRACTITIONER

## 2017-05-10 PROCEDURE — G8598 ASA/ANTIPLAT THER USED: HCPCS | Performed by: NURSE PRACTITIONER

## 2017-05-10 PROCEDURE — 1101F PT FALLS ASSESS-DOCD LE1/YR: CPT | Performed by: NURSE PRACTITIONER

## 2017-05-10 PROCEDURE — 1036F TOBACCO NON-USER: CPT | Performed by: NURSE PRACTITIONER

## 2017-05-10 NOTE — PROGRESS NOTES
CC: Evaluation for power wheelchair mobility issues    Lamont Lynch is a 87 y.o. year old male who is having mobility issues in their home and use of a cane, walker, or manual wheelchair will not meet needs.    HPI:    Signs and symptoms that limit ambulation/mobility include: Muscle weakness due to CAD     Diagnosis that are responsible for the signs and symptoms CAD   Medications her other treatments prescribed for the signs and symptoms: Plavix, Pravachol, Zetia    Progression of mobility difficulty include: Limited to 200 feet due to lower extremity weakness and pain    Other diagnoses that may relate to ambulatory problems: CAD     History of falls:Yes    Cane or walker will not meet patient's mobility needs in the home for the following reasons:  Unsteady gait due to weakness and pain  Upper extremity and lower extremity strength of 3-4 /5   Poor balance or postural stability    Manual wheelchair will not meet patient's mobility needs in the home for the following reasons:  Decreased upper arm strength bilaterally   Decreased range of motion of both shoulders and hands due to degenerative disease  Pain level  10/10       A/P: Causing mobility issues in their home and use of the cane, walker or manual wheelchair will not meet needs    DX: Z74.09. Impaired mobility    Plan: Order mobility/power chair assessment  Referral done to physical therapy    Face to face visit with no other medical issues discussed at this visit.  Problem diagnoses causing impaired mobility include: CAD, DJD    MELISSA Chan

## 2017-05-10 NOTE — MR AVS SNAPSHOT
"        Lamont Lynch   5/10/2017 10:20 AM   Office Visit   MRN: [id number]    Department:  [facility]   Dept Phone:  [phone]    Description:  Male : 1930   Provider:  MELISSA Chan           Reason for Visit     Orders Needed Mobile Scooter       Allergies as of 5/10/2017     Allergen Noted Reactions    Nkda [No Known Drug Allergy] 10/25/2012         You were diagnosed with     Impaired mobility   [002392]         Vital Signs     Blood Pressure Pulse Temperature Respirations Height Weight    124/72 mmHg 65 36.4 °C (97.5 °F) 14 1.778 m (5' 10\") 100.064 kg (220 lb 9.6 oz)    Body Mass Index Oxygen Saturation Smoking Status             31.65 kg/m2 97% Former Smoker         Basic Information     Date Of Birth Sex Race Ethnicity Preferred Language    1930 Male White Non- English      Problem List              ICD-10-CM Priority Class Noted - Resolved    CAD (coronary artery disease) I25.10 High  2011 - Present    Essential hypertension, benign (Chronic) I10 Medium  2011 - Present    CKD (chronic kidney disease) stage 3, GFR 30-59 ml/min N18.3 High  2011 - Present    Hyperlipidemia (Chronic) E78.5 Medium  2011 - Present    Elevated LFTs R94.5 High  2013 - Present    Excessive drinking alcohol F10.10 High  2013 - Present    Basal cell carcinoma of brow C44.319 Medium Chronic 2014 - Present    BMI 31.0-31.9,adult Z68.31   2015 - Present    Macrocytic anemia D53.9   2015 - Present    Chronic cough R05   2017 - Present    Impaired mobility Z74.09   5/10/2017 - Present      Health Maintenance        Date Due Completion Dates    IMM PNEUMOCOCCAL 65+ (ADULT) LOW/MEDIUM RISK SERIES (2 of 2 - PPSV23) 2017 (Originally 3/1/2017) 3/1/2016    IMM ZOSTER VACCINE 2018 (Originally 1990) ---    IMM DTaP/Tdap/Td Vaccine (1 - Tdap) 2018 (Originally 1949) ---    COLONOSCOPY 2020 (N/S)    Override on " 11/1/2010: (N/S)            Current Immunizations     13-VALENT PCV PREVNAR 3/1/2016    Influenza Vaccine Adult HD 12/27/2016    Influenza Vaccine Quad Inj (Preserved) 1/8/2015  3:06 PM      Below and/or attached are the medications your provider expects you to take. Review all of your home medications and newly ordered medications with your provider and/or pharmacist. Follow medication instructions as directed by your provider and/or pharmacist. Please keep your medication list with you and share with your provider. Update the information when medications are discontinued, doses are changed, or new medications (including over-the-counter products) are added; and carry medication information at all times in the event of emergency situations     Allergies:  NKDA - (reactions not documented)               Medications  Valid as of: May 10, 2017 - 10:36 AM    Generic Name Brand Name Tablet Size Instructions for use    AmLODIPine Besylate (Tab) NORVASC 2.5 MG TAKE ONE TABLET BY MOUTH ONCE DAILY        Clopidogrel Bisulfate (Tab) PLAVIX 75 MG TAKE ONE TABLET BY MOUTH ONCE DAILY        Ezetimibe (Tab) ZETIA 10 MG TAKE ONE TABLET BY MOUTH ONCE DAILY        Furosemide (Tab) LASIX 20 MG TAKE ONE TABLET BY MOUTH ONCE DAILY        Homeopathic Products   by Ophthalmic route 3 times a day.        Multiple Vitamins-Minerals   Take  by mouth every day.        Nitroglycerin (SL Tab) NITROSTAT 0.4 MG Place 0.4 mg under tongue every 5 minutes as needed.        Non Formulary Request Non Formulary Request  Systane eye gtts, besivance eye gtts, tradnisolone eye gtts, ketorolac eye gtts two to three times a day        Omega-3 Fatty Acids (Cap) OMEGA 3 FA 1000 MG Take 1,000 mg by mouth 2 Times a Day.        Omeprazole (CAPSULE DELAYED RELEASE) PRILOSEC 20 MG One 1/2 hr before dinner        Pravastatin Sodium (Tab) PRAVACHOL 10 MG TAKE ONE TABLET BY MOUTH ONCE DAILY        Triamcinolone Acetonide (Cream) KENALOG 0.1 % Apply  Topically to  affected area twice daily        Valsartan (Tab) DIOVAN 160 MG TAKE ONE TABLET BY MOUTH ONCE DAILY IN THE MORNING        .                 Medicines prescribed today were sent to:     [facility] [address] [place] [address] [address] [facility]    [address] [facility] [address]    Phone: [phone] Fax: [phone]    Open 24 Hours?: No      Medication refill instructions:       If your prescription bottle indicates you have medication refills left, it is not necessary to call your provider’s office. Please contact your pharmacy and they will refill your medication.    If your prescription bottle indicates you do not have any refills left, you may request refills at any time through one of the following ways: The online PeriphaGen system (except Urgent Care), by calling your provider’s office, or by asking your pharmacy to contact your provider’s office with a refill request. Medication refills are processed only during regular business hours and may not be available until the next business day. Your provider may request additional information or to have a follow-up visit with you prior to refilling your medication.   *Please Note: Medication refills are assigned a new Rx number when refilled electronically. Your pharmacy may indicate that no refills were authorized even though a new prescription for the same medication is available at the pharmacy. Please request the medicine by name with the pharmacy before contacting your provider for a refill.        Referral     A referral request has been sent to our patient care coordination department. Please allow 3-5 business days for us to process this request and contact you either by phone or mail. If you do not hear from us by the 5th business day, please call us at [phone].        Other Notes About Your Plan     Pt prefers to be called Alan Herrera Access Code: [email or web address]  Expires: 5/26/2017 11:07 AM    PeriphaGen  A secure, online tool to  manage your health information     [employer]’s Neurelis® is a secure, online tool that connects you to your personalized health information from the privacy of your home -- day or night - making it very easy for you to manage your healthcare. Once the activation process is completed, you can even access your medical information using the Neurelis leidy, which is available for free in the Apple Leidy store or Google Play store.     Neurelis provides the following levels of access (as shown below):   My Chart Features   [facility] Primary [facility] Doctor [facility]  Specialists [facility]  Urgent  Care Non-[facility]  Primary Care  Doctor   Email your healthcare team securely and privately 24/7 X X X    Manage appointments: schedule your next appointment; view details of past/upcoming appointments X      Request prescription refills. X      View recent personal medical records, including lab and immunizations X X X X   View health record, including health history, allergies, medications X X X X   Read reports about your outpatient visits, procedures, consult and ER notes X X X X   See your discharge summary, which is a recap of your hospital and/or ER visit that includes your diagnosis, lab results, and care plan. X X       How to register for Neurelis:  1. Go to  [email or web address].  2. Click on the Sign Up Now box, which takes you to the New Member Sign Up page. You will need to provide the following information:  a. Enter your Neurelis Access Code exactly as it appears at the top of this page. (You will not need to use this code after you’ve completed the sign-up process. If you do not sign up before the expiration date, you must request a new code.)   b. Enter your date of birth.   c. Enter your home email address.   d. Click Submit, and follow the next screen’s instructions.  3. Create a Neurelis ID. This will be your Neurelis login ID and cannot be changed, so think of one that is secure and easy to remember.  4. Create a BullGuardt  password. You can change your password at any time.  5. Enter your Password Reset Question and Answer. This can be used at a later time if you forget your password.   6. Enter your e-mail address. This allows you to receive e-mail notifications when new information is available in [employer].  7. Click Sign Up. You can now view your health information.    For assistance activating your [employer] account, call [phone]

## 2017-05-18 RX ORDER — VALSARTAN 160 MG/1
TABLET ORAL
Qty: 90 TAB | Refills: 1 | Status: SHIPPED | OUTPATIENT
Start: 2017-05-18 | End: 2017-11-15 | Stop reason: SDUPTHER

## 2017-05-18 NOTE — TELEPHONE ENCOUNTER
Was the patient seen in the last year in this department? Yes     Does patient have an active prescription for medications requested? No     Received Request Via: Pharmacy      Pt met protocol?: Yes    LAST OV 05/10/2017    BP Readings from Last 1 Encounters:   05/10/17 124/72

## 2017-06-05 RX ORDER — CLOPIDOGREL BISULFATE 75 MG/1
TABLET ORAL
Qty: 90 TAB | Refills: 0 | Status: SHIPPED | OUTPATIENT
Start: 2017-06-05 | End: 2017-09-12 | Stop reason: SDUPTHER

## 2017-06-05 NOTE — TELEPHONE ENCOUNTER
Was the patient seen in the last year in this department? Yes     Does patient have an active prescription for medications requested? No     Received Request Via: Pharmacy      Pt met protocol?: Yes    LAST OV 05/10/2017

## 2017-06-22 RX ORDER — EZETIMIBE 10 MG/1
TABLET ORAL
Qty: 90 TAB | Refills: 0 | Status: SHIPPED | OUTPATIENT
Start: 2017-06-22 | End: 2017-09-25 | Stop reason: SDUPTHER

## 2017-06-23 ENCOUNTER — TELEPHONE (OUTPATIENT)
Dept: MEDICAL GROUP | Facility: PHYSICIAN GROUP | Age: 82
End: 2017-06-23

## 2017-06-26 ENCOUNTER — OFFICE VISIT (OUTPATIENT)
Dept: URGENT CARE | Facility: PHYSICIAN GROUP | Age: 82
End: 2017-06-26
Payer: MEDICARE

## 2017-06-26 VITALS
SYSTOLIC BLOOD PRESSURE: 112 MMHG | RESPIRATION RATE: 16 BRPM | BODY MASS INDEX: 31.5 KG/M2 | HEART RATE: 56 BPM | WEIGHT: 220 LBS | DIASTOLIC BLOOD PRESSURE: 66 MMHG | HEIGHT: 70 IN | TEMPERATURE: 98.8 F | OXYGEN SATURATION: 96 %

## 2017-06-26 DIAGNOSIS — S51.011A SKIN TEAR OF RIGHT ELBOW WITHOUT COMPLICATION, INITIAL ENCOUNTER: Primary | ICD-10-CM
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
## 2017-06-26 PROCEDURE — 99214 OFFICE O/P EST MOD 30 MIN: CPT | Performed by: PHYSICIAN ASSISTANT

## 2017-06-26 RX ORDER — MUPIROCIN CALCIUM 20 MG/G
CREAM TOPICAL
Qty: 1 TUBE | Refills: 2 | Status: SHIPPED | OUTPATIENT
Start: 2017-06-26 | End: 2019-09-03

## 2017-06-26 NOTE — MR AVS SNAPSHOT
"        Lamont Lynch   2017 5:00 PM   Office Visit   MRN: [id number]    Department:  [facility]   Dept Phone:  [phone]    Description:  Male : 1930   Provider:  Yi Alba PA-C           Reason for Visit     Arm Injury fall x 1 week, abrasion to the left elbow.        Allergies as of 2017     Allergen Noted Reactions    Nkda [No Known Drug Allergy] 10/25/2012         You were diagnosed with     Skin tear of right elbow without complication, initial encounter   [[id number]]         Vital Signs     Blood Pressure Pulse Temperature Respirations Height Weight    112/66 mmHg 56 37.1 °C (98.8 °F) 16 1.778 m (5' 10\") 99.791 kg (220 lb)    Body Mass Index Oxygen Saturation Smoking Status             31.57 kg/m2 96% Former Smoker         Basic Information     Date Of Birth Sex Race Ethnicity Preferred Language    1930 Male White Non- English      Problem List              ICD-10-CM Priority Class Noted - Resolved    CAD (coronary artery disease) I25.10 High  2011 - Present    Essential hypertension, benign (Chronic) I10 Medium  2011 - Present    CKD (chronic kidney disease) stage 3, GFR 30-59 ml/min N18.3 High  2011 - Present    Hyperlipidemia (Chronic) E78.5 Medium  2011 - Present    Elevated LFTs R94.5 High  2013 - Present    Excessive drinking alcohol F10.10 High  2013 - Present    Basal cell carcinoma of brow C44.319 Medium Chronic 2014 - Present    BMI 31.0-31.9,adult Z68.31   2015 - Present    Macrocytic anemia D53.9   2015 - Present    Chronic cough R05   2017 - Present    Impaired mobility Z74.09   5/10/2017 - Present      Health Maintenance        Date Due Completion Dates    IMM PNEUMOCOCCAL 65+ (ADULT) LOW/MEDIUM RISK SERIES (2 of 2 - PPSV23) 2017 (Originally 3/1/2017) 3/1/2016    IMM ZOSTER VACCINE 2018 (Originally 1990) ---    IMM DTaP/Tdap/Td Vaccine (1 - Tdap) 2018 (Originally 1949) ---  "    COLONOSCOPY 11/1/2020 11/1/2010 (N/S)    Override on 11/1/2010: (N/S)            Current Immunizations     13-VALENT PCV PREVNAR 3/1/2016    Influenza Vaccine Adult HD 12/27/2016    Influenza Vaccine Quad Inj (Preserved) 1/8/2015  3:06 PM      Below and/or attached are the medications your provider expects you to take. Review all of your home medications and newly ordered medications with your provider and/or pharmacist. Follow medication instructions as directed by your provider and/or pharmacist. Please keep your medication list with you and share with your provider. Update the information when medications are discontinued, doses are changed, or new medications (including over-the-counter products) are added; and carry medication information at all times in the event of emergency situations     Allergies:  NKDA - (reactions not documented)               Medications  Valid as of: June 26, 2017 -  6:53 PM    Generic Name Brand Name Tablet Size Instructions for use    AmLODIPine Besylate (Tab) NORVASC 2.5 MG TAKE ONE TABLET BY MOUTH ONCE DAILY        Clopidogrel Bisulfate (Tab) PLAVIX 75 MG TAKE ONE TABLET BY MOUTH ONCE DAILY        Ezetimibe (Tab) ZETIA 10 MG Take one tab by mouth once daily         Furosemide (Tab) LASIX 20 MG TAKE ONE TABLET BY MOUTH ONCE DAILY        Homeopathic Products   by Ophthalmic route 3 times a day.        Multiple Vitamins-Minerals   Take  by mouth every day.        Mupirocin Calcium (Cream) BACTROBAN 2 % Apply small amount to skin as directed 2 times daily as directed.        Nitroglycerin (SL Tab) NITROSTAT 0.4 MG Place 0.4 mg under tongue every 5 minutes as needed.        Non Formulary Request Non Formulary Request  Systane eye gtts, besivance eye gtts, tradnisolone eye gtts, ketorolac eye gtts two to three times a day        Omega-3 Fatty Acids (Cap) OMEGA 3 FA 1000 MG Take 1,000 mg by mouth 2 Times a Day.        Omeprazole (CAPSULE DELAYED RELEASE) PRILOSEC 20 MG One 1/2 hr  before dinner        Pravastatin Sodium (Tab) PRAVACHOL 10 MG TAKE ONE TABLET BY MOUTH ONCE DAILY        Triamcinolone Acetonide (Cream) KENALOG 0.1 % Apply  Topically to affected area twice daily        Valsartan (Tab) DIOVAN 160 MG TAKE ONE TABLET BY MOUTH ONCE DAILY IN THE MORNING        .                 Medicines prescribed today were sent to:     [facility] [address] [place] [address], [place] - [address] [facility]    [address] [facility] [place] [address]    Phone: [phone] Fax: [phone]    Open 24 Hours?: No      Medication refill instructions:       If your prescription bottle indicates you have medication refills left, it is not necessary to call your provider’s office. Please contact your pharmacy and they will refill your medication.    If your prescription bottle indicates you do not have any refills left, you may request refills at any time through one of the following ways: The online [employer] system (except Urgent Care), by calling your provider’s office, or by asking your pharmacy to contact your provider’s office with a refill request. Medication refills are processed only during regular business hours and may not be available until the next business day. Your provider may request additional information or to have a follow-up visit with you prior to refilling your medication.   *Please Note: Medication refills are assigned a new Rx number when refilled electronically. Your pharmacy may indicate that no refills were authorized even though a new prescription for the same medication is available at the pharmacy. Please request the medicine by name with the pharmacy before contacting your provider for a refill.        Instructions    Skin Tear Care  A skin tear is a wound in which the top layer of skin has peeled off. This is a common problem with aging because the skin becomes thinner and more fragile as a person gets older. In addition, some medicines, such as oral corticosteroids, can lead to skin  thinning if taken for long periods of time.   A skin tear is often repaired with tape or skin adhesive strips. This keeps the skin that has been peeled off in contact with the healthier skin beneath. Depending on the location of the wound, a bandage (dressing) may be applied over the tape or skin adhesive strips. Sometimes, during the healing process, the skin turns black and dies. Even when this happens, the torn skin acts as a good dressing until the skin underneath gets healthier and repairs itself.  HOME CARE INSTRUCTIONS   · Change dressings once per day or as directed by your caregiver.  ¨ Gently clean the skin tear and the area around the tear using saline solution or mild soap and water.  ¨ Do not rub the injured skin dry. Let the area air dry.  ¨ Apply petroleum jelly or an antibiotic cream or ointment to keep the tear moist. This will help the wound heal. Do not allow a scab to form.  ¨ If the dressing sticks before the next dressing change, moisten it with warm soapy water and gently remove it.  · Protect the injured skin until it has healed.  · Only take over-the-counter or prescription medicines as directed by your caregiver.  · Take showers or baths using warm soapy water. Apply a new dressing after the shower or bath.  · Keep all follow-up appointments as directed by your caregiver.    SEEK IMMEDIATE MEDICAL CARE IF:   · You have redness, swelling, or increasing pain in the skin tear.  · You have pus coming from the skin tear.  · You have chills.  · You have a red streak that goes away from the skin tear.  · You have a bad smell coming from the tear or dressing.  · You have a fever or persistent symptoms for more than 2-3 days.  · You have a fever and your symptoms suddenly get worse.  MAKE SURE YOU:  · Understand these instructions.  · Will watch this condition.  · Will get help right away if your child is not doing well or gets worse.     This information is not intended to replace advice given to  you by your health care provider. Make sure you discuss any questions you have with your health care provider.     Document Released: 09/12/2002 Document Revised: 09/11/2013 Document Reviewed: 07/01/2013  Elsevier Interactive Patient Education ©2016 [employer]         Other Notes About Your Plan     Pt prefers to be called Alan           Castlerock REO Access Code: [email or web address]  Expires: 7/26/2017  6:53 PM    Castlerock REO  A secure, online tool to manage your health information     [employer]’s Castlerock REO® is a secure, online tool that connects you to your personalized health information from the privacy of your home -- day or night - making it very easy for you to manage your healthcare. Once the activation process is completed, you can even access your medical information using the Castlerock REO leidy, which is available for free in the Apple Leidy store or Google Play store.     Castlerock REO provides the following levels of access (as shown below):   My Chart Features   [facility]  Specialists [facility]  Urgent  Care [facility]  Primary Care  Doctor   Email your healthcare team securely and privately 24/7 X X X    Manage appointments: schedule your next appointment; view details of past/upcoming appointments X      Request prescription refills. X      View recent personal medical records, including lab and immunizations X X X X   View health record, including health history, allergies, medications X X X X   Read reports about your outpatient visits, procedures, consult and ER notes X X X X   See your discharge summary, which is a recap of your hospital and/or ER visit that includes your diagnosis, lab results, and care plan. X X       How to register for Castlerock REO:  1. Go to  [email or web address].  2. Click on the Sign Up Now box, which takes you to the New Member Sign Up page. You will need to provide the following information:  a. Enter your Castlerock REO Access Code exactly as it appears at the top of this  page. (You will not need to use this code after you’ve completed the sign-up process. If you do not sign up before the expiration date, you must request a new code.)   b. Enter your date of birth.   c. Enter your home email address.   d. Click Submit, and follow the next screen’s instructions.  3. Create a MideoMe ID. This will be your MideoMe login ID and cannot be changed, so think of one that is secure and easy to remember.  4. Create a MideoMe password. You can change your password at any time.  5. Enter your Password Reset Question and Answer. This can be used at a later time if you forget your password.   6. Enter your e-mail address. This allows you to receive e-mail notifications when new information is available in MideoMe.  7. Click Sign Up. You can now view your health information.    For assistance activating your MideoMe account, call [phone]

## 2017-06-27 NOTE — PATIENT INSTRUCTIONS
Skin Tear Care  A skin tear is a wound in which the top layer of skin has peeled off. This is a common problem with aging because the skin becomes thinner and more fragile as a person gets older. In addition, some medicines, such as oral corticosteroids, can lead to skin thinning if taken for long periods of time.   A skin tear is often repaired with tape or skin adhesive strips. This keeps the skin that has been peeled off in contact with the healthier skin beneath. Depending on the location of the wound, a bandage (dressing) may be applied over the tape or skin adhesive strips. Sometimes, during the healing process, the skin turns black and dies. Even when this happens, the torn skin acts as a good dressing until the skin underneath gets healthier and repairs itself.  HOME CARE INSTRUCTIONS   · Change dressings once per day or as directed by your caregiver.  ¨ Gently clean the skin tear and the area around the tear using saline solution or mild soap and water.  ¨ Do not rub the injured skin dry. Let the area air dry.  ¨ Apply petroleum jelly or an antibiotic cream or ointment to keep the tear moist. This will help the wound heal. Do not allow a scab to form.  ¨ If the dressing sticks before the next dressing change, moisten it with warm soapy water and gently remove it.  · Protect the injured skin until it has healed.  · Only take over-the-counter or prescription medicines as directed by your caregiver.  · Take showers or baths using warm soapy water. Apply a new dressing after the shower or bath.  · Keep all follow-up appointments as directed by your caregiver.    SEEK IMMEDIATE MEDICAL CARE IF:   · You have redness, swelling, or increasing pain in the skin tear.  · You have pus coming from the skin tear.  · You have chills.  · You have a red streak that goes away from the skin tear.  · You have a bad smell coming from the tear or dressing.  · You have a fever or persistent symptoms for more than 2-3 days.  · You  have a fever and your symptoms suddenly get worse.  MAKE SURE YOU:  · Understand these instructions.  · Will watch this condition.  · Will get help right away if your child is not doing well or gets worse.     This information is not intended to replace advice given to you by your health care provider. Make sure you discuss any questions you have with your health care provider.     Document Released: 09/12/2002 Document Revised: 09/11/2013 Document Reviewed: 07/01/2013  [employer] Interactive Patient Education ©2016 [employer]

## 2017-06-27 NOTE — PROGRESS NOTES
Subjective:      Lamont Lycnh is a 87 y.o. male who presents with Arm Injury    PMH:  has a past medical history of CAD (coronary artery disease); Stented coronary artery; and BMI 31.0-31.9,adult (2/25/2015).  MEDS:   Current outpatient prescriptions:   •  ezetimibe (ZETIA) 10 MG Tab, Take one tab by mouth once daily , Disp: 90 Tab, Rfl: 0  •  clopidogrel (PLAVIX) 75 MG Tab, TAKE ONE TABLET BY MOUTH ONCE DAILY, Disp: 90 Tab, Rfl: 0  •  valsartan (DIOVAN) 160 MG Tab, TAKE ONE TABLET BY MOUTH ONCE DAILY IN THE MORNING, Disp: 90 Tab, Rfl: 1  •  furosemide (LASIX) 20 MG Tab, TAKE ONE TABLET BY MOUTH ONCE DAILY, Disp: 90 Tab, Rfl: 0  •  pravastatin (PRAVACHOL) 10 MG Tab, TAKE ONE TABLET BY MOUTH ONCE DAILY, Disp: 90 Tab, Rfl: 1  •  amlodipine (NORVASC) 2.5 MG Tab, TAKE ONE TABLET BY MOUTH ONCE DAILY, Disp: 90 Tab, Rfl: 1  •  triamcinolone acetonide (KENALOG) 0.1 % Cream, Apply  Topically to affected area twice daily, Disp: 1 Tube, Rfl: 2  •  Non Formulary Request, Systane eye gtts, besivance eye gtts, tradnisolone eye gtts, ketorolac eye gtts two to three times a day, Disp: , Rfl:   •  nitroglycerin (NITROSTAT) 0.4 MG SUBL, Place 0.4 mg under tongue every 5 minutes as needed., Disp: , Rfl:   •  omeprazole (PRILOSEC) 20 MG CPDR, One 1/2 hr before dinner, Disp: 30 Cap, Rfl: 0  •  Multiple Vitamins-Minerals (CENTRUM SILVER PO), Take  by mouth every day., Disp: , Rfl:   •  docosahexanoic acid (FISH OIL) 1000 MG CAPS, Take 1,000 mg by mouth 2 Times a Day., Disp: , Rfl:   •  Homeopathic Products (SIMILASAN ALLERGY EYE RELIEF OP), by Ophthalmic route 3 times a day., Disp: , Rfl:   ALLERGIES:   Allergies   Allergen Reactions   • Nkda [No Known Drug Allergy]      SURGHX:   Past Surgical History   Procedure Laterality Date   • Cataract phaco with iol  8/26/2014     Performed by Fei Zapata M.D. at [facility]   • Cataract phaco with iol  9/23/2014     Performed by Fei Zapata M.D. at [facility]   • Angioplasty balloon     • Eye surgery     • Appendectomy       SOCHX:  reports that he quit smoking about 44 years ago. His smoking use included Cigarettes. He has a 10 pack-year smoking history. He has never used smokeless tobacco. He reports that he does not drink alcohol or use illicit drugs.  FH:  Reviewed with patient/family. Not pertinent to this complaint.            HPI Comments: Patient presents with:  Arm Injury: fall x 1 week, abrasion to the left elbow.          Arm Injury  This is a new problem. The current episode started in the past 7 days. The problem has been unchanged. Pertinent negatives include no arthralgias, joint swelling or myalgias. Associated symptoms comments: Abrasion/skin tear to right arm. The symptoms are aggravated by bending. Treatments tried: cleaning, neosporin. The treatment provided mild relief.       Review of Systems   Musculoskeletal: Negative for myalgias, joint swelling and arthralgias.   Skin:        Skin tear     All other systems reviewed and are negative.         Objective:     /66 mmHg  Pulse 56  Temp(Src) 37.1 °C (98.8 °F)  Resp 16  Ht 1.778 m (5' 10\")  Wt 99.791 kg (220 lb)  BMI 31.57 kg/m2  SpO2 96%     Physical Exam   Constitutional: He is oriented to person, place, and time. He appears well-developed and well-nourished. No distress.   HENT:   Head: Normocephalic and atraumatic.   Nose: Nose normal.   Eyes: Conjunctivae and EOM are normal. Pupils are equal, round, and reactive to light.   Neck: Normal range of motion. Neck supple. No JVD present.   Cardiovascular: Normal rate and intact distal pulses.    Pulmonary/Chest: Effort normal.   Abdominal: Soft.   Musculoskeletal: Normal range of motion.        Right elbow: He exhibits laceration. He exhibits normal range of motion, no swelling and no deformity.        Arms:  Lymphadenopathy:     He has no cervical adenopathy.   Neurological: He is alert and oriented to person, place, and " time.   Skin: Skin is warm and dry.   Nursing note and vitals reviewed.         Procedure:   Skin was trimmed without bleeding or complication.   Wound was dressed with polysporin ointment and non stick dressing.       Assessment/Plan:     1. Skin tear of right elbow without complication, initial encounter  mupirocin calcium (BACTROBAN) 2 % Cream     PT should follow up with PCP in 1-2 days for re-evaluation if symptoms have not improved.  Discussed red flags and reasons to return to UC or ED.  Pt and/or family verbalized understanding of diagnosis and follow up instructions and was given informational handout on diagnosis.  PT discharged.

## 2017-06-30 ASSESSMENT — ENCOUNTER SYMPTOMS
MYALGIAS: 0
ARTHRALGIAS: 0
ROS SKIN COMMENTS: SKIN TEAR
JOINT SWELLING: 0

## 2017-08-03 RX ORDER — AMLODIPINE BESYLATE 2.5 MG/1
TABLET ORAL
Qty: 90 TAB | Refills: 1 | Status: SHIPPED | OUTPATIENT
Start: 2017-08-03 | End: 2018-01-30 | Stop reason: SDUPTHER

## 2017-08-03 RX ORDER — FUROSEMIDE 20 MG/1
TABLET ORAL
Qty: 90 TAB | Refills: 1 | Status: SHIPPED | OUTPATIENT
Start: 2017-08-03 | End: 2018-05-29 | Stop reason: SDUPTHER

## 2017-08-23 ENCOUNTER — HOSPITAL ENCOUNTER (OUTPATIENT)
Dept: LAB | Facility: MEDICAL CENTER | Age: 82
End: 2017-08-23
Attending: INTERNAL MEDICINE
Payer: MEDICARE

## 2017-08-23 LAB
ALBUMIN SERPL BCP-MCNC: 3.8 G/DL (ref 3.2–4.9)
ALBUMIN/GLOB SERPL: 1.4 G/DL
ALP SERPL-CCNC: 57 U/L (ref 30–99)
ALT SERPL-CCNC: 20 U/L (ref 2–50)
ANION GAP SERPL CALC-SCNC: 7 MMOL/L (ref 0–11.9)
ANISOCYTOSIS BLD QL SMEAR: ABNORMAL
AST SERPL-CCNC: 19 U/L (ref 12–45)
BASOPHILS # BLD AUTO: 0.8 % (ref 0–1.8)
BASOPHILS # BLD: 0.04 K/UL (ref 0–0.12)
BILIRUB SERPL-MCNC: 0.5 MG/DL (ref 0.1–1.5)
BUN SERPL-MCNC: 33 MG/DL (ref 8–22)
BURR CELLS BLD QL SMEAR: NORMAL
CALCIUM SERPL-MCNC: 9.6 MG/DL (ref 8.5–10.5)
CHLORIDE SERPL-SCNC: 111 MMOL/L (ref 96–112)
CHOLEST SERPL-MCNC: 112 MG/DL (ref 100–199)
CO2 SERPL-SCNC: 21 MMOL/L (ref 20–33)
COMMENT 1642: NORMAL
CREAT SERPL-MCNC: 1.56 MG/DL (ref 0.5–1.4)
EOSINOPHIL # BLD AUTO: 0.15 K/UL (ref 0–0.51)
EOSINOPHIL NFR BLD: 2.9 % (ref 0–6.9)
ERYTHROCYTE [DISTWIDTH] IN BLOOD BY AUTOMATED COUNT: 52.1 FL (ref 35.9–50)
GFR SERPL CREATININE-BSD FRML MDRD: 42 ML/MIN/1.73 M 2
GLOBULIN SER CALC-MCNC: 2.8 G/DL (ref 1.9–3.5)
GLUCOSE SERPL-MCNC: 104 MG/DL (ref 65–99)
HCT VFR BLD AUTO: 41.9 % (ref 42–52)
HDLC SERPL-MCNC: 36 MG/DL
HGB BLD-MCNC: 13.6 G/DL (ref 14–18)
IMM GRANULOCYTES # BLD AUTO: 0.03 K/UL (ref 0–0.11)
IMM GRANULOCYTES NFR BLD AUTO: 0.6 % (ref 0–0.9)
LDLC SERPL CALC-MCNC: 48 MG/DL
LG PLATELETS BLD QL SMEAR: NORMAL
LYMPHOCYTES # BLD AUTO: 1.67 K/UL (ref 1–4.8)
LYMPHOCYTES NFR BLD: 32.5 % (ref 22–41)
MACROCYTES BLD QL SMEAR: ABNORMAL
MCH RBC QN AUTO: 33.7 PG (ref 27–33)
MCHC RBC AUTO-ENTMCNC: 32.5 G/DL (ref 33.7–35.3)
MCV RBC AUTO: 104 FL (ref 81.4–97.8)
MONOCYTES # BLD AUTO: 0.45 K/UL (ref 0–0.85)
MONOCYTES NFR BLD AUTO: 8.8 % (ref 0–13.4)
MORPHOLOGY BLD-IMP: NORMAL
NEUTROPHILS # BLD AUTO: 2.8 K/UL (ref 1.82–7.42)
NEUTROPHILS NFR BLD: 54.4 % (ref 44–72)
NRBC # BLD AUTO: 0 K/UL
NRBC BLD AUTO-RTO: 0 /100 WBC
OVALOCYTES BLD QL SMEAR: NORMAL
PLATELET # BLD AUTO: 128 K/UL (ref 164–446)
PMV BLD AUTO: 11.7 FL (ref 9–12.9)
POIKILOCYTOSIS BLD QL SMEAR: NORMAL
POTASSIUM SERPL-SCNC: 4.6 MMOL/L (ref 3.6–5.5)
PROT SERPL-MCNC: 6.6 G/DL (ref 6–8.2)
RBC # BLD AUTO: 4.03 M/UL (ref 4.7–6.1)
RBC BLD AUTO: PRESENT
SODIUM SERPL-SCNC: 139 MMOL/L (ref 135–145)
T4 FREE SERPL-MCNC: 0.83 NG/DL (ref 0.53–1.43)
TRIGL SERPL-MCNC: 140 MG/DL (ref 0–149)
TSH SERPL DL<=0.005 MIU/L-ACNC: 2.6 UIU/ML (ref 0.3–3.7)
WBC # BLD AUTO: 5.1 K/UL (ref 4.8–10.8)

## 2017-08-23 PROCEDURE — 80061 LIPID PANEL: CPT

## 2017-08-23 PROCEDURE — 84439 ASSAY OF FREE THYROXINE: CPT

## 2017-08-23 PROCEDURE — 84443 ASSAY THYROID STIM HORMONE: CPT

## 2017-08-23 PROCEDURE — 36415 COLL VENOUS BLD VENIPUNCTURE: CPT

## 2017-08-23 PROCEDURE — 85025 COMPLETE CBC W/AUTO DIFF WBC: CPT

## 2017-08-23 PROCEDURE — 80053 COMPREHEN METABOLIC PANEL: CPT

## 2017-09-05 ENCOUNTER — HOSPITAL ENCOUNTER (OUTPATIENT)
Dept: PHYSICAL THERAPY | Facility: REHABILITATION | Age: 82
End: 2017-09-05
Attending: NURSE PRACTITIONER
Payer: MEDICARE

## 2017-09-05 PROCEDURE — 97542 WHEELCHAIR MNGMENT TRAINING: CPT

## 2017-09-05 RX ORDER — PRAVASTATIN SODIUM 10 MG
TABLET ORAL
Qty: 90 TAB | Refills: 1 | Status: SHIPPED | OUTPATIENT
Start: 2017-09-05 | End: 2018-03-05 | Stop reason: SDUPTHER

## 2017-09-13 RX ORDER — CLOPIDOGREL BISULFATE 75 MG/1
TABLET ORAL
Qty: 90 TAB | Refills: 1 | Status: SHIPPED | OUTPATIENT
Start: 2017-09-13 | End: 2018-03-26 | Stop reason: SDUPTHER

## 2017-09-26 RX ORDER — EZETIMIBE 10 MG/1
TABLET ORAL
Qty: 90 TAB | Refills: 1 | Status: SHIPPED | OUTPATIENT
Start: 2017-09-26 | End: 2018-03-19 | Stop reason: SDUPTHER

## 2017-09-26 NOTE — TELEPHONE ENCOUNTER
Was the patient seen in the last year in this department? Yes     Does patient have an active prescription for medications requested? No     Received Request Via: Pharmacy      Pt met protocol?: Yes pt last ov 5/17   LDL   Date Value Ref Range Status   08/23/2017 48 <100 mg/dL Final     Cholesterol,Tot   Date Value Ref Range Status   08/23/2017 112 100 - 199 mg/dL Final

## 2017-09-26 NOTE — TELEPHONE ENCOUNTER
Pt has had OV within the 12 month protocol and lipid panel is current. 6 month supply sent to pharmacy.   Lab Results   Component Value Date/Time    CHOLSTRLTOT 112 08/23/2017 10:01 AM    LDL 48 08/23/2017 10:01 AM    HDL 36 (A) 08/23/2017 10:01 AM    TRIGLYCERIDE 140 08/23/2017 10:01 AM       Lab Results   Component Value Date/Time    SODIUM 139 08/23/2017 10:01 AM    POTASSIUM 4.6 08/23/2017 10:01 AM    CHLORIDE 111 08/23/2017 10:01 AM    CO2 21 08/23/2017 10:01 AM    GLUCOSE 104 (H) 08/23/2017 10:01 AM    BUN 33 (H) 08/23/2017 10:01 AM    CREATININE 1.56 (H) 08/23/2017 10:01 AM     Lab Results   Component Value Date/Time    ALKPHOSPHAT 57 08/23/2017 10:01 AM    ASTSGOT 19 08/23/2017 10:01 AM    ALTSGPT 20 08/23/2017 10:01 AM    TBILIRUBIN 0.5 08/23/2017 10:01 AM

## 2017-11-16 ENCOUNTER — OFFICE VISIT (OUTPATIENT)
Dept: MEDICAL GROUP | Facility: PHYSICIAN GROUP | Age: 82
End: 2017-11-16
Payer: MEDICARE

## 2017-11-16 VITALS
TEMPERATURE: 97.9 F | HEIGHT: 70 IN | WEIGHT: 215 LBS | BODY MASS INDEX: 30.78 KG/M2 | DIASTOLIC BLOOD PRESSURE: 60 MMHG | HEART RATE: 66 BPM | OXYGEN SATURATION: 95 % | SYSTOLIC BLOOD PRESSURE: 116 MMHG | RESPIRATION RATE: 16 BRPM

## 2017-11-16 DIAGNOSIS — Z23 NEED FOR VACCINATION: ICD-10-CM

## 2017-11-16 DIAGNOSIS — K14.8 TONGUE LESION: ICD-10-CM

## 2017-11-16 PROCEDURE — 99214 OFFICE O/P EST MOD 30 MIN: CPT | Mod: 25 | Performed by: NURSE PRACTITIONER

## 2017-11-16 PROCEDURE — 90662 IIV NO PRSV INCREASED AG IM: CPT | Performed by: NURSE PRACTITIONER

## 2017-11-16 PROCEDURE — 90732 PPSV23 VACC 2 YRS+ SUBQ/IM: CPT | Performed by: NURSE PRACTITIONER

## 2017-11-16 PROCEDURE — G0009 ADMIN PNEUMOCOCCAL VACCINE: HCPCS | Performed by: NURSE PRACTITIONER

## 2017-11-16 PROCEDURE — G0008 ADMIN INFLUENZA VIRUS VAC: HCPCS | Performed by: NURSE PRACTITIONER

## 2017-11-16 RX ORDER — VALSARTAN 160 MG/1
TABLET ORAL
Qty: 90 TAB | Refills: 1 | Status: SHIPPED | OUTPATIENT
Start: 2017-11-16 | End: 2018-05-13 | Stop reason: SDUPTHER

## 2017-11-16 NOTE — TELEPHONE ENCOUNTER
Was the patient seen in the last year in this department? Yes     Does patient have an active prescription for medications requested? No     Received Request Via: Patient      Pt met protocol?: Yes, OV today (11/16)   BP Readings from Last 1 Encounters:   11/16/17 116/60

## 2017-11-16 NOTE — ASSESSMENT & PLAN NOTE
Noticed a lesion on the right side of his tongue about 2 months ago.  Thinks that he bit his tongue and it isn't healing.  It has possibly gotten bigger, and is now starting to interfere with eating.  Has had many skin cancers removed.  Discussed plan.

## 2017-11-16 NOTE — TELEPHONE ENCOUNTER
Refill X 6 months, sent to pharmacy.Pt. Seen in the last 6 months per protocol.   Lab Results   Component Value Date/Time    SODIUM 139 08/23/2017 10:01 AM    POTASSIUM 4.6 08/23/2017 10:01 AM    CHLORIDE 111 08/23/2017 10:01 AM    CO2 21 08/23/2017 10:01 AM    GLUCOSE 104 (H) 08/23/2017 10:01 AM    BUN 33 (H) 08/23/2017 10:01 AM    CREATININE 1.56 (H) 08/23/2017 10:01 AM

## 2017-11-16 NOTE — PROGRESS NOTES
Chief Complaint   Patient presents with   • Mouth Lesions     on rt side of tongue x 2 months       HISTORY OF PRESENT ILLNESS: Patient is a 87 y.o. male established patient who presents today to discuss the following issues:    Tongue lesion  Noticed a lesion on the right side of his tongue about 2 months ago.  Thinks that he bit his tongue and it isn't healing.  It has possibly gotten bigger, and is now starting to interfere with eating.  Has had many skin cancers removed.  Discussed plan.    BMI 30.0-30.9,adult  Patient is aware of BMI elevation.  Brief discussion of diet, exercise, and lifestyle modification.      Need for vaccination  Would like a flu shot and pneumovax 23 today.        Patient Active Problem List    Diagnosis Date Noted   • Elevated LFTs 12/30/2013     Priority: High   • Excessive drinking alcohol 12/30/2013     Priority: High   • CAD (coronary artery disease) 06/02/2011     Priority: High   • CKD (chronic kidney disease) stage 3, GFR 30-59 ml/min 06/02/2011     Priority: High   • Basal cell carcinoma of brow 09/02/2014     Priority: Medium     Class: Chronic   • Hyperlipidemia 12/01/2011     Priority: Medium   • Essential hypertension, benign 06/02/2011     Priority: Medium   • Tongue lesion 11/16/2017   • Need for vaccination 11/16/2017   • BMI 30.0-30.9,adult 11/16/2017   • Impaired mobility 05/10/2017   • Chronic cough 04/26/2017   • Macrocytic anemia 04/22/2015       Allergies:Nkda [no known drug allergy]    Current Outpatient Prescriptions   Medication Sig Dispense Refill   • ezetimibe (ZETIA) 10 MG Tab TAKE ONE TABLET BY MOUTH ONCE DAILY 90 Tab 1   • clopidogrel (PLAVIX) 75 MG Tab TAKE ONE TABLET BY MOUTH ONCE DAILY 90 Tab 1   • pravastatin (PRAVACHOL) 10 MG Tab TAKE ONE TABLET BY MOUTH ONCE DAILY 90 Tab 1   • amlodipine (NORVASC) 2.5 MG Tab TAKE ONE TABLET BY MOUTH ONCE DAILY 90 Tab 1   • furosemide (LASIX) 20 MG Tab TAKE ONE TABLET BY MOUTH ONCE DAILY 90 Tab 1   • mupirocin calcium  (BACTROBAN) 2 % Cream Apply small amount to skin as directed 2 times daily as directed. 1 Tube 2   • valsartan (DIOVAN) 160 MG Tab TAKE ONE TABLET BY MOUTH ONCE DAILY IN THE MORNING 90 Tab 1   • triamcinolone acetonide (KENALOG) 0.1 % Cream Apply  Topically to affected area twice daily 1 Tube 2   • Non Formulary Request Systane eye gtts, besivance eye gtts, tradnisolone eye gtts, ketorolac eye gtts two to three times a day     • nitroglycerin (NITROSTAT) 0.4 MG SUBL Place 0.4 mg under tongue every 5 minutes as needed.     • omeprazole (PRILOSEC) 20 MG CPDR One 1/2 hr before dinner 30 Cap 0   • Multiple Vitamins-Minerals (CENTRUM SILVER PO) Take  by mouth every day.     • docosahexanoic acid (FISH OIL) 1000 MG CAPS Take 1,000 mg by mouth 2 Times a Day.     • Homeopathic Products (SIMILASAN ALLERGY EYE RELIEF OP) by Ophthalmic route 3 times a day.       No current facility-administered medications for this visit.        Social History   Substance Use Topics   • Smoking status: Former Smoker     Packs/day: 0.50     Years: 20.00     Types: Cigarettes     Quit date: 10/25/1972   • Smokeless tobacco: Never Used   • Alcohol use No      Comment: quit 2015       Family Status   Relation Status   • Mother    • Father    • Brother    History reviewed. No pertinent family history.    Review of Systems:   Constitutional: Negative for fever, chills, weight loss and malaise/fatigue.   HENT: Negative for ear pain, nosebleeds, congestion, sore throat and neck pain. Positive for lesion on right tongue.  Eyes: Negative for blurred vision.   Respiratory: Negative for cough, sputum production, shortness of breath and wheezing.    Cardiovascular: Negative for chest pain, palpitations, orthopnea and leg swelling.   Gastrointestinal: Negative for heartburn, nausea, vomiting and abdominal pain.   Genitourinary: Negative for dysuria, urgency and frequency.   Musculoskeletal: Negative for myalgias, joint pain, and  "back pain.  Skin: Negative for rash and itching.   Neurological: Negative for dizziness, tingling, tremors, sensory change, focal weakness and headaches.   Endo/Heme/Allergies: Does not bruise/bleed easily.   Psychiatric/Behavioral: Negative for depression, suicidal ideas and memory loss.  The patient is not nervous/anxious and does not have insomnia.    All other systems reviewed and are negative except as in HPI.    Exam:  Blood pressure 116/60, pulse 66, temperature 36.6 °C (97.9 °F), resp. rate 16, height 1.778 m (5' 10\"), weight 97.5 kg (215 lb), SpO2 95 %.  General:  Well nourished, well developed male in NAD  Head: Grossly normal.  Raised lesion on right side of tongue.  Neck: Supple without JVD or bruit. Thyroid is not enlarged.  Pulmonary: Clear to ausculation. Normal effort. No rales, ronchi, or wheezing.  Cardiovascular: Regular rate and rhythm without murmur.   Extremities: No clubbing, cyanosis, or edema.  Skin: Intact with no obvious rashes or lesions.  Neuro: Grossly intact.  Psych: Alert and oriented x 3.  Mood and affect appropriate.    Medical decision-making and discussion: Lamont is here today to discuss a tongue lesion.  An urgent referral was sent to ENT, and he was given flu and pneuovax 23.  He will follow-up here as needed.     I have placed the below orders and discussed them with an approved delegating provider. The MA is performing the below orders under the direction of Dr. Holman, who have provided verbal consent for supervision.            Assessment/Plan:  1. Need for vaccination  INFLUENZA VACCINE, HIGH DOSE (65+ ONLY)    Pneumococal Polysaccharide Vaccine 23-Valent =>3yo SQ/IM   2. BMI 30.0-30.9,adult  Patient identified as having weight management issue.  Appropriate orders and counseling given.   3. Tongue lesion  REFERRAL TO ENT       Return if symptoms worsen or fail to improve.    Please note that this dictation was created using voice recognition software. I have made every " reasonable attempt to correct obvious errors, but I expect that there are errors of grammar and possibly content that I did not discover before finalizing the note.

## 2018-01-30 RX ORDER — AMLODIPINE BESYLATE 2.5 MG/1
TABLET ORAL
Qty: 90 TAB | Refills: 0 | Status: SHIPPED | OUTPATIENT
Start: 2018-01-30 | End: 2018-05-03 | Stop reason: SDUPTHER

## 2018-01-30 NOTE — TELEPHONE ENCOUNTER
Was the patient seen in the last year in this department? Yes     Does patient have an active prescription for medications requested? No     Received Request Via: Pharmacy      Pt met protocol?: Yes, OV 11/17   BP Readings from Last 1 Encounters:   11/16/17 116/60

## 2018-01-30 NOTE — TELEPHONE ENCOUNTER
Refill X 3 months, sent to pharmacy.Pt. Seen in the last 6 months per protocol.   Lab Results   Component Value Date/Time    SODIUM 139 08/23/2017 10:01 AM    POTASSIUM 4.6 08/23/2017 10:01 AM    CHLORIDE 111 08/23/2017 10:01 AM    CO2 21 08/23/2017 10:01 AM    GLUCOSE 104 (H) 08/23/2017 10:01 AM    BUN 33 (H) 08/23/2017 10:01 AM    CREATININE 1.56 (H) 08/23/2017 10:01 AM

## 2018-02-21 ENCOUNTER — HOSPITAL ENCOUNTER (OUTPATIENT)
Facility: MEDICAL CENTER | Age: 83
End: 2018-02-21
Attending: OTOLARYNGOLOGY | Admitting: OTOLARYNGOLOGY
Payer: MEDICARE

## 2018-02-21 VITALS
OXYGEN SATURATION: 98 % | TEMPERATURE: 97.9 F | RESPIRATION RATE: 18 BRPM | HEIGHT: 70 IN | SYSTOLIC BLOOD PRESSURE: 122 MMHG | WEIGHT: 216.05 LBS | DIASTOLIC BLOOD PRESSURE: 47 MMHG | HEART RATE: 65 BPM | BODY MASS INDEX: 30.93 KG/M2

## 2018-02-21 LAB
EKG IMPRESSION: NORMAL
ERYTHROCYTE [DISTWIDTH] IN BLOOD BY AUTOMATED COUNT: 49.6 FL (ref 35.9–50)
HCT VFR BLD AUTO: 41.2 % (ref 42–52)
HGB BLD-MCNC: 13.8 G/DL (ref 14–18)
MCH RBC QN AUTO: 33.3 PG (ref 27–33)
MCHC RBC AUTO-ENTMCNC: 33.5 G/DL (ref 33.7–35.3)
MCV RBC AUTO: 99.5 FL (ref 81.4–97.8)
PLATELET # BLD AUTO: 153 K/UL (ref 164–446)
PMV BLD AUTO: 10.6 FL (ref 9–12.9)
RBC # BLD AUTO: 4.14 M/UL (ref 4.7–6.1)
WBC # BLD AUTO: 5.7 K/UL (ref 4.8–10.8)

## 2018-02-21 PROCEDURE — 160002 HCHG RECOVERY MINUTES (STAT): Performed by: OTOLARYNGOLOGY

## 2018-02-21 PROCEDURE — 500123 HCHG BOVIE, CONTROL W/BLADE: Performed by: OTOLARYNGOLOGY

## 2018-02-21 PROCEDURE — 88305 TISSUE EXAM BY PATHOLOGIST: CPT

## 2018-02-21 PROCEDURE — 501838 HCHG SUTURE GENERAL: Performed by: OTOLARYNGOLOGY

## 2018-02-21 PROCEDURE — 160028 HCHG SURGERY MINUTES - 1ST 30 MINS LEVEL 3: Performed by: OTOLARYNGOLOGY

## 2018-02-21 PROCEDURE — 160039 HCHG SURGERY MINUTES - EA ADDL 1 MIN LEVEL 3: Performed by: OTOLARYNGOLOGY

## 2018-02-21 PROCEDURE — 700101 HCHG RX REV CODE 250

## 2018-02-21 PROCEDURE — 93010 ELECTROCARDIOGRAM REPORT: CPT | Performed by: INTERNAL MEDICINE

## 2018-02-21 PROCEDURE — 700111 HCHG RX REV CODE 636 W/ 250 OVERRIDE (IP)

## 2018-02-21 PROCEDURE — 160035 HCHG PACU - 1ST 60 MINS PHASE I: Performed by: OTOLARYNGOLOGY

## 2018-02-21 PROCEDURE — 160009 HCHG ANES TIME/MIN: Performed by: OTOLARYNGOLOGY

## 2018-02-21 PROCEDURE — 93005 ELECTROCARDIOGRAM TRACING: CPT | Performed by: OTOLARYNGOLOGY

## 2018-02-21 PROCEDURE — 160048 HCHG OR STATISTICAL LEVEL 1-5: Performed by: OTOLARYNGOLOGY

## 2018-02-21 PROCEDURE — 500700 HCHG HEMOCLIP, SMALL (RED): Performed by: OTOLARYNGOLOGY

## 2018-02-21 PROCEDURE — 500122 HCHG BOVIE, BLADE: Performed by: OTOLARYNGOLOGY

## 2018-02-21 PROCEDURE — 502573 HCHG PACK, ENT: Performed by: OTOLARYNGOLOGY

## 2018-02-21 PROCEDURE — 500331 HCHG COTTONOID, SURG PATTIE: Performed by: OTOLARYNGOLOGY

## 2018-02-21 PROCEDURE — 85027 COMPLETE CBC AUTOMATED: CPT

## 2018-02-21 PROCEDURE — 500126 HCHG BOVIE, NEEDLE TIP: Performed by: OTOLARYNGOLOGY

## 2018-02-21 PROCEDURE — 160036 HCHG PACU - EA ADDL 30 MINS PHASE I: Performed by: OTOLARYNGOLOGY

## 2018-02-21 RX ORDER — LIDOCAINE HYDROCHLORIDE AND EPINEPHRINE 10; 10 MG/ML; UG/ML
INJECTION, SOLUTION INFILTRATION; PERINEURAL
Status: DISCONTINUED | OUTPATIENT
Start: 2018-02-21 | End: 2018-02-21 | Stop reason: HOSPADM

## 2018-02-21 RX ORDER — LIDOCAINE HYDROCHLORIDE 10 MG/ML
0.5 INJECTION, SOLUTION INFILTRATION; PERINEURAL
Status: DISCONTINUED | OUTPATIENT
Start: 2018-02-21 | End: 2018-02-21 | Stop reason: HOSPADM

## 2018-02-21 RX ORDER — SODIUM CHLORIDE, SODIUM LACTATE, POTASSIUM CHLORIDE, CALCIUM CHLORIDE 600; 310; 30; 20 MG/100ML; MG/100ML; MG/100ML; MG/100ML
INJECTION, SOLUTION INTRAVENOUS CONTINUOUS
Status: DISCONTINUED | OUTPATIENT
Start: 2018-02-21 | End: 2018-02-21 | Stop reason: HOSPADM

## 2018-02-21 RX ORDER — LIDOCAINE AND PRILOCAINE 25; 25 MG/G; MG/G
1 CREAM TOPICAL
Status: DISCONTINUED | OUTPATIENT
Start: 2018-02-21 | End: 2018-02-21 | Stop reason: HOSPADM

## 2018-02-21 RX ORDER — LIDOCAINE HYDROCHLORIDE 10 MG/ML
INJECTION, SOLUTION INFILTRATION; PERINEURAL
Status: DISCONTINUED
Start: 2018-02-21 | End: 2018-02-21 | Stop reason: HOSPADM

## 2018-02-21 RX ADMIN — SODIUM CHLORIDE, SODIUM LACTATE, POTASSIUM CHLORIDE, CALCIUM CHLORIDE 1000 ML: 600; 310; 30; 20 INJECTION, SOLUTION INTRAVENOUS at 11:30

## 2018-02-21 ASSESSMENT — PAIN SCALES - GENERAL
PAINLEVEL_OUTOF10: ASSUMED PAIN PRESENT
PAINLEVEL_OUTOF10: 0

## 2018-02-21 NOTE — OP REPORT
DATE OF SERVICE:  02/21/2018    PREOPERATIVE DIAGNOSIS:  Right tongue mass.    POSTOPERATIVE DIAGNOSIS:  Right tongue mass.    PROCEDURES PERFORMED:  Direct laryngoscopy, esophagoscopy and excision of   right posterior tongue mass.    DESCRIPTION OF PROCEDURE:  The patient was given general anesthesia and   intubated without difficulty, had a right raised firm mass, 2 cm to 2.5 cm   along and posteriorly on the right tongue, brought for elective excision and   full evaluation for possible squamous cell cancer.  The patient was given   general anesthesia, properly anesthetized, intubated without difficulty, had a   direct laryngoscopy, looked at the piriform sinuses, postcricoid, endolarynx,   epiglottis, base of tongue, all of which were normal.  Pharyngeal walls were   normal.  I removed the direct laryngoscope and then placed the cervical   esophagoscope to the limits of cervical esophagoscope that was all normal with   no masses, lesions, or otherwise, and then with a headlight pulling the   tongue forward, there was a right posterior tongue mass, 2 cm, firm, raised,   white, somewhat exophytic.  It was injected with lidocaine with epinephrine   and then completely excised with the needle tip cautery using a cautery on   coag and cut at 15.  All bleeding was controlled with cautery and was taken to   recovery room in good condition.  Biopsy pending.       ____________________________________     MD LETICIA SCHMIDT / DARON    DD:  02/21/2018 12:26:54  DT:  02/21/2018 12:48:47    D#:  [id number]  Job#:  [id number]

## 2018-02-21 NOTE — DISCHARGE INSTRUCTIONS
ACTIVITY: Rest and take it easy for the first 24 hours.  A responsible adult is recommended to remain with you during that time.  It is normal to feel sleepy.  We encourage you to not do anything that requires balance, judgment or coordination.    MILD FLU-LIKE SYMPTOMS ARE NORMAL. YOU MAY EXPERIENCE GENERALIZED MUSCLE ACHES, THROAT IRRITATION, HEADACHE AND/OR SOME NAUSEA.    FOR 24 HOURS DO NOT:  Drive, operate machinery or run household appliances.  Drink beer or alcoholic beverages.   Make important decisions or sign legal documents.    SPECIAL INSTRUCTIONS: PER MD'S INSTRUCTIONS   Return to ER for dehydration, SOB, serious bleeding, neck swelling, inability to take PO. FU 2 weeks in office; call for appointment.    ELEVATE HOB 30 DEGREES    DIET: To avoid nausea, slowly advance diet as tolerated, avoiding spicy or greasy foods for the first day.  Add more substantial food to your diet according to your physician's instructions.  Babies can be fed formula or breast milk as soon as they are hungry.  INCREASE FLUIDS AND FIBER TO AVOID CONSTIPATION.    SURGICAL DRESSING/BATHING: PER MD'S INSTRUCTIONS    FOLLOW-UP APPOINTMENT:  A follow-up appointment should be arranged with your doctor in PER MD; call to schedule.    You should CALL YOUR PHYSICIAN if you develop:  Fever greater than 101 degrees F.  Pain not relieved by medication, or persistent nausea or vomiting.  Excessive bleeding (blood soaking through dressing) or unexpected drainage from the wound.  Extreme redness or swelling around the incision site, drainage of pus or foul smelling drainage.  Inability to urinate or empty your bladder within 8 hours.  Problems with breathing or chest pain.    You should call 911 if you develop problems with breathing or chest pain.  If you are unable to contact your doctor or surgical center, you should go to the nearest emergency room or urgent care center.  Physician's telephone #: [phone]    If any questions arise,  call your doctor.  If your doctor is not available, please feel free to call the Surgical Center at [phone].  The Center is open Monday through Friday from 7AM to 7PM.  You can also call the HEALTH HOTLINE open 24 hours/day, 7 days/week and speak to a nurse at [phone], or toll free at [phone].    A registered nurse may call you a few days after your surgery to see how you are doing after your procedure.    MEDICATIONS: Resume taking daily medication.  Take prescribed pain medication with food.  If no medication is prescribed, you may take non-aspirin pain medication if needed.  PAIN MEDICATION CAN BE VERY CONSTIPATING.  Take a stool softener or laxative such as senokot, pericolace, or milk of magnesia if needed.    Prescription given for HYCET SUSP.  Last pain medication given at ***.    If your physician has prescribed pain medication that includes Acetaminophen (Tylenol), do not take additional Acetaminophen (Tylenol) while taking the prescribed medication.    Depression / Suicide Risk    As you are discharged from this [facility] facility, it is important to learn how to keep safe from harming yourself.    Recognize the warning signs:  · Abrupt changes in personality, positive or negative- including increase in energy   · Giving away possessions  · Change in eating patterns- significant weight changes-  positive or negative  · Change in sleeping patterns- unable to sleep or sleeping all the time   · Unwillingness or inability to communicate  · Depression  · Unusual sadness, discouragement and loneliness  · Talk of wanting to die  · Neglect of personal appearance   · Rebelliousness- reckless behavior  · Withdrawal from people/activities they love  · Confusion- inability to concentrate     If you or a loved one observes any of these behaviors or has concerns about self-harm, here's what you can do:  · Talk about it- your feelings and reasons for harming yourself  · Remove any means that you  might use to hurt yourself (examples: pills, rope, extension cords, firearm)  · Get professional help from the community (Mental Health, Substance Abuse, psychological counseling)  · Do not be alone:Call your Safe Contact- someone whom you trust who will be there for you.  · Call your local CRISIS HOTLINE 215-6557 or 161-992-0903  · Call your local Children's Mobile Crisis Response Team [place] (342) 213-7956 or www.[employer]  · Call the toll free National Suicide Prevention Hotlines   · National Suicide Prevention Lifeline 018-980-OZJK (4790)  · National Hope Line Network 800-SUICIDE (211-4245)

## 2018-02-21 NOTE — OR NURSING
1345 pt resting quietly.  No c/o at this time.  PO fluids tolerated well.    1410 report to Alex GARNER

## 2018-02-21 NOTE — OR NURSING
1234 [facility] and [facility]. Report received and care assumed. Monitors on - VSS, breathing even and unlabored  1245 - wife at bedside  1335 - pt resting comf, states neck feels stiff - offered warm pack -lily RA, and  water  1415 discharge instructions reviewed with pt and family. All questions and concerns addressed.  1453 pt discharged via w/c to private car - acc by family and Darleen ODELL

## 2018-02-21 NOTE — OR SURGEON
Immediate Post OP Note    PreOp Diagnosis: Right tongue lesion    PostOp Diagnosis: same    Procedure(s):  LARYNGOSCOPY- DIRECT - Wound Class: Clean Contaminated  ESOPHAGOSCOPY- BIOPSY AS NEEDED - Wound Class: Clean Contaminated  LESION EXCISION GENERAL- TONGUE  - Wound Class: Clean Contaminated    Surgeon(s):  Allan Latham M.D.    Anesthesiologist/Type of Anesthesia:  Anesthesiologist: Kevin Sharma M.D./General    Surgical Staff:  Circulator: Kelly Penn R.N.  Scrub Person: Umesh Canales    Specimens:  * No specimens in log *    Estimated Blood Loss: 5 cc    Findings: right 2 cm mass of tongue    Complications: none        2/21/2018 12:33 PM Allan Latham

## 2018-03-05 RX ORDER — PRAVASTATIN SODIUM 10 MG
TABLET ORAL
Qty: 90 TAB | Refills: 1 | Status: SHIPPED | OUTPATIENT
Start: 2018-03-05 | End: 2018-08-30 | Stop reason: SDUPTHER

## 2018-03-05 NOTE — TELEPHONE ENCOUNTER
Was the patient seen in the last year in this department? Yes     Does patient have an active prescription for medications requested? No     Received Request Via: Pharmacy      Pt met protocol?: Yes pt last ov 11/17   Lab Results  Component Value Date/Time   CHOLSTRLTOT 112 08/23/2017 1001   TRIGLYCERIDE 140 08/23/2017 1001   HDL 36 (A) 08/23/2017 1001   LDL 48 08/23/2017 1001

## 2018-03-20 NOTE — TELEPHONE ENCOUNTER
Pt met protocol?: Yes pt last ov 11/2017   Lab Results  Component Value Date/Time   CHOLSTRLTOT 112 08/23/2017 1001   TRIGLYCERIDE 140 08/23/2017 1001   HDL 36 (A) 08/23/2017 1001   LDL 48 08/23/2017 1001       No results found for: HBA1C

## 2018-03-21 RX ORDER — EZETIMIBE 10 MG/1
TABLET ORAL
Qty: 90 TAB | Refills: 0 | Status: SHIPPED | OUTPATIENT
Start: 2018-03-21 | End: 2018-06-20 | Stop reason: SDUPTHER

## 2018-03-26 DIAGNOSIS — R21 RASH: ICD-10-CM

## 2018-03-26 DIAGNOSIS — I25.10 CORONARY ARTERY DISEASE WITHOUT ANGINA PECTORIS, UNSPECIFIED VESSEL OR LESION TYPE, UNSPECIFIED WHETHER NATIVE OR TRANSPLANTED HEART: ICD-10-CM

## 2018-03-26 RX ORDER — CLOPIDOGREL BISULFATE 75 MG/1
TABLET ORAL
Qty: 30 TAB | Refills: 0 | Status: SHIPPED | OUTPATIENT
Start: 2018-03-26 | End: 2018-04-23 | Stop reason: SDUPTHER

## 2018-03-27 RX ORDER — TRIAMCINOLONE ACETONIDE 1 MG/G
CREAM TOPICAL
Qty: 1 TUBE | Refills: 0 | Status: SHIPPED | OUTPATIENT
Start: 2018-03-27 | End: 2019-09-03

## 2018-04-23 ENCOUNTER — OFFICE VISIT (OUTPATIENT)
Dept: MEDICAL GROUP | Facility: PHYSICIAN GROUP | Age: 83
End: 2018-04-23
Payer: MEDICARE

## 2018-04-23 VITALS
HEIGHT: 70 IN | WEIGHT: 218.48 LBS | SYSTOLIC BLOOD PRESSURE: 130 MMHG | HEART RATE: 58 BPM | TEMPERATURE: 98.1 F | DIASTOLIC BLOOD PRESSURE: 86 MMHG | OXYGEN SATURATION: 97 % | BODY MASS INDEX: 31.28 KG/M2 | RESPIRATION RATE: 16 BRPM

## 2018-04-23 DIAGNOSIS — E78.5 HYPERLIPIDEMIA, UNSPECIFIED HYPERLIPIDEMIA TYPE: Chronic | ICD-10-CM

## 2018-04-23 DIAGNOSIS — I25.10 CORONARY ARTERY DISEASE INVOLVING NATIVE HEART, ANGINA PRESENCE UNSPECIFIED, UNSPECIFIED VESSEL OR LESION TYPE: ICD-10-CM

## 2018-04-23 DIAGNOSIS — I25.10 CORONARY ARTERY DISEASE WITHOUT ANGINA PECTORIS, UNSPECIFIED VESSEL OR LESION TYPE, UNSPECIFIED WHETHER NATIVE OR TRANSPLANTED HEART: ICD-10-CM

## 2018-04-23 DIAGNOSIS — K21.9 GASTROESOPHAGEAL REFLUX DISEASE WITHOUT ESOPHAGITIS: ICD-10-CM

## 2018-04-23 DIAGNOSIS — E66.9 OBESITY (BMI 30-39.9): ICD-10-CM

## 2018-04-23 DIAGNOSIS — R20.0 NUMBNESS: ICD-10-CM

## 2018-04-23 DIAGNOSIS — N18.30 CKD (CHRONIC KIDNEY DISEASE) STAGE 3, GFR 30-59 ML/MIN (HCC): ICD-10-CM

## 2018-04-23 PROBLEM — I10 HYPERTENSION: Status: ACTIVE | Noted: 2018-04-23

## 2018-04-23 PROCEDURE — 99214 OFFICE O/P EST MOD 30 MIN: CPT | Performed by: INTERNAL MEDICINE

## 2018-04-23 RX ORDER — CLOPIDOGREL BISULFATE 75 MG/1
TABLET ORAL
Qty: 90 TAB | Refills: 3 | Status: SHIPPED | OUTPATIENT
Start: 2018-04-23 | End: 2019-06-13 | Stop reason: SDUPTHER

## 2018-04-23 ASSESSMENT — PATIENT HEALTH QUESTIONNAIRE - PHQ9: CLINICAL INTERPRETATION OF PHQ2 SCORE: 0

## 2018-04-23 NOTE — PROGRESS NOTES
PRIMARY CARE CLINIC NEW PATIENT H&P  Chief Complaint   Patient presents with   • Coronary Artery Disease     clopidogrel (PLAVIX) Refill      History of Present Illness     CAD (coronary artery disease)  He is following with his cardiologist Dr. Mendes and thinks his follow up visit is in June 2018. Says he gets left shoulder numbness with exertion which resolves with rest. Says his cardiologist does know about this.     Hypertension  Controlled on amlodipine, valsartan.     Hyperlipidemia  Followed by his cardiologist Dr. Mendes, controlled on pravastatin and zetia.     CKD (chronic kidney disease) stage 3, GFR 30-59 ml/min  Saw Dr. Najjar a few years ago, not following with him currently.     Obesity (BMI 30-39.9)  He isn't very active but says he doesn't eat much. Drinks V8 as a source of his vegetables.     GERD (gastroesophageal reflux disease)  Has occasional symptoms, doesn't think he takes omeprazole everyday.     Numbness  Says with certain positions (sitting in his recliner, laying on his right side) he has right hand numbness. If he cracks his shoulder and moves it around then his symptoms resolve. He also has numbness of his feet for a long standing time.     Current Outpatient Prescriptions   Medication Sig Dispense Refill   • clopidogrel (PLAVIX) 75 MG Tab TAKE ONE TABLET BY MOUTH ONCE DAILY 90 Tab 3   • triamcinolone acetonide (KENALOG) 0.1 % Cream Apply  Topically to affected area twice daily 1 Tube 0   • ezetimibe (ZETIA) 10 MG Tab TAKE ONE TABLET BY MOUTH ONCE DAILY 90 Tab 0   • pravastatin (PRAVACHOL) 10 MG Tab TAKE ONE TABLET BY MOUTH ONCE DAILY 90 Tab 1   • amlodipine (NORVASC) 2.5 MG Tab TAKE ONE TABLET BY MOUTH ONCE DAILY 90 Tab 0   • valsartan (DIOVAN) 160 MG Tab TAKE ONE TABLET BY MOUTH ONCE DAILY IN THE MORNING 90 Tab 1   • furosemide (LASIX) 20 MG Tab TAKE ONE TABLET BY MOUTH ONCE DAILY 90 Tab 1   • mupirocin calcium (BACTROBAN) 2 % Cream Apply small amount to skin as directed 2 times  daily as directed. 1 Tube 2   • Non Formulary Request Systane eye gtts, besivance eye gtts, tradnisolone eye gtts, ketorolac eye gtts two to three times a day     • nitroglycerin (NITROSTAT) 0.4 MG SUBL Place 0.4 mg under tongue every 5 minutes as needed.     • omeprazole (PRILOSEC) 20 MG CPDR One 1/2 hr before dinner 30 Cap 0   • Multiple Vitamins-Minerals (CENTRUM SILVER PO) Take  by mouth every day.     • docosahexanoic acid (FISH OIL) 1000 MG CAPS Take 1,000 mg by mouth 2 Times a Day.     • Homeopathic Products (SIMILASAN ALLERGY EYE RELIEF OP) by Ophthalmic route 3 times a day.       No current facility-administered medications for this visit.        Past Medical History:   Diagnosis Date   • Arrhythmia    • Arthritis    • Basal cell carcinoma of brow 9/2/2014    Referred to dermatologist Dr. Mcdowell    • BMI 31.0-31.9,adult 2/25/2015   • CAD (coronary artery disease)    • Cataract    • GERD (gastroesophageal reflux disease) 2/25/2015   • Hyperlipidemia 12/1/2011   • Hypertension    • Stented coronary artery      Past Surgical History:   Procedure Laterality Date   • ESOPHAGOSCOPY N/A 2/21/2018    Procedure: ESOPHAGOSCOPY- BIOPSY AS NEEDED;  Surgeon: Allan Latham M.D.;  Location: SURGERY SAME DAY [facility] ORS;  Service: Ent   • LESION EXCISION GENERAL  2/21/2018    Procedure: LESION EXCISION GENERAL- TONGUE ;  Surgeon: Allan Latham M.D.;  Location: SURGERY SAME DAY [facility] ORS;  Service: Ent   • LARYNGOSCOPY N/A 2/21/2018    Procedure: LARYNGOSCOPY- DIRECT;  Surgeon: Allan Latham M.D.;  Location: SURGERY SAME DAY [facility] ORS;  Service: Ent   • CATARACT PHACO WITH IOL  9/23/2014    Performed by Fei Zapata M.D. at [facility]   • CATARACT PHACO WITH IOL  8/26/2014    Performed by Fei Zapata M.D. at SURGERY [facility] ORS   • ANGIOPLASTY BALLOON     • APPENDECTOMY     • EYE SURGERY       Social History   Substance Use Topics   • Smoking status: Former Smoker      "Packs/day: 0.50     Years: 20.00     Types: Cigarettes     Quit date: 10/25/1972   • Smokeless tobacco: Never Used   • Alcohol use No      Comment: quit 2015     Social History     Social History Narrative    [employer]       History reviewed. No pertinent family history.  Family Status   Relation Status   • Mother    • Father    • Brother      Allergies: Nkda [no known drug allergy]    ROS  Constitutional: Negative for fatigue/generalized weakness.   HEENT: Negative for  vision changes, hearing changes    Respiratory: Negative for shortness of breath  Cardiovascular: Negative for chest pain, palpitations  Gastrointestinal: Negative for blood in stool, constipation, diarrhea  Genitourinary: Negative for dysuria, polyuria  Musculoskeletal: Negative for myalgias, back pain, and joint pain.   Skin: Negative for rash  Neurological: Positive for numbness as per HPI  Psychiatric/Behavioral: Negative for depression, anxiety     Objective   Blood pressure 130/86, pulse (!) 58, temperature 36.7 °C (98.1 °F), resp. rate 16, height 1.778 m (5' 10\"), weight 99.1 kg (218 lb 7.6 oz), SpO2 97 %. Body mass index is 31.35 kg/m².    General: Alert, oriented. In no acute distress   HEET: EOMI, PERRL, conjunctiva non-injected, sclera non-icteric.  Nares patent with no significant congestion or drainage.  Anna pinnae, external auditory canals, TM pearly gray with normal light reflex bilaterally.Oral mucous membranes pink and moist with no lesions.  Neck: supple with no cervical, subclavicular lymphadenopathy, JVD, palpable thyroid nodules   Lungs: clear to auscultation bilaterally with good excursion.  CV: regular rate and rhythm.  Abdomen soft, non-distended, non-tender with normal bowel sounds. No hepatosplenomegaly, no masses palpated  Skin: no lesions. Warm, dry   Psychiatric: appropriate mood and affect       Assessment and Plan   The following treatment plan was discussed     1. Coronary artery disease " involving native heart, angina presence unspecified, unspecified vessel or lesion type  Following with Dr. Mendes, has full blood work panel ordered through him for this summer and says he has follow up with him in June. Dr. Mendes is aware of his left shoulder and arm numbness with exertion and per patient report reassured him that it's not his heart.   - clopidogrel (PLAVIX) 75 MG Tab; TAKE ONE TABLET BY MOUTH ONCE DAILY  Dispense: 90 Tab; Refill: 3    2. Hyperlipidemia, unspecified hyperlipidemia type  Well controlled on pravastatin and zetia, following with Dr. Mendes.     Lab Results   Component Value Date/Time    CHOLSTRLTOT 112 08/23/2017 10:01 AM    LDL 48 08/23/2017 10:01 AM    HDL 36 (A) 08/23/2017 10:01 AM    TRIGLYCERIDE 140 08/23/2017 10:01 AM       3. CKD (chronic kidney disease) stage 3, GFR 30-59 ml/min  Has labs due this summer, stable.     4. Obesity (BMI 30-39.9)  - Patient identified as having weight management issue.  Appropriate orders and counseling given.    5. Gastroesophageal reflux disease without esophagitis  Uses prilosec occasionally.     6. Numbness  He declines physical therapy and physiatry for his right hand numbness for now; says he will consider these options if it impairs his abilities more.       Return in about 6 months (around 10/23/2018).    Health Maintenance      Health Maintenance Due   Topic Date Due   • Annual Wellness Visit  04/06/1930       Sean Jurado MD  Internal Medicine  [facility]

## 2018-04-23 NOTE — PATIENT INSTRUCTIONS
"· Try adding metamucil to help bulk your stool     You have been identified as having a Body Mass Index over 30, which is characterized as \"obese\".  Focus on consuming a dietary pattern that emphasizes intake of vegetables, fruits, and whole grains; includes low-fat dairy products, poultry, fish, legumes, nontropical vegetable oils and nuts; and limits intake of sweets, sugar-sweetened beverages and red meats.   Look up Mediterranean diet for more information.  "

## 2018-04-23 NOTE — ASSESSMENT & PLAN NOTE
He is following with his cardiologist Dr. Mendes and thinks his follow up visit is in June 2018. Says he gets left shoulder numbness with exertion which resolves with rest. Says his cardiologist does know about this.

## 2018-04-23 NOTE — ASSESSMENT & PLAN NOTE
Says with certain positions (sitting in his recliner, laying on his right side) he has right hand numbness. If he cracks his shoulder and moves it around then his symptoms resolve. He also has numbness of his feet for a long standing time.

## 2018-05-04 RX ORDER — AMLODIPINE BESYLATE 2.5 MG/1
TABLET ORAL
Qty: 90 TAB | Refills: 1 | Status: SHIPPED | OUTPATIENT
Start: 2018-05-04 | End: 2018-10-28 | Stop reason: SDUPTHER

## 2018-05-14 RX ORDER — VALSARTAN 160 MG/1
TABLET ORAL
Qty: 90 TAB | Refills: 1 | Status: SHIPPED | OUTPATIENT
Start: 2018-05-14 | End: 2018-08-06 | Stop reason: SINTOL

## 2018-05-14 NOTE — TELEPHONE ENCOUNTER
Refill X 6 months, sent to pharmacy.Pt. Seen in the last 6 months per protocol.   Lab Results   Component Value Date/Time    SODIUM 139 08/23/2017 10:01 AM    POTASSIUM 4.6 08/23/2017 10:01 AM    CHLORIDE 111 08/23/2017 10:01 AM    CO2 21 08/23/2017 10:01 AM    GLUCOSE 104 (H) 08/23/2017 10:01 AM    BUN 33 (H) 08/23/2017 10:01 AM    CREATININE 1.56 (H) 08/23/2017 10:01 AM       BP Readings from Last 1 Encounters:   04/23/18 130/86

## 2018-05-14 NOTE — TELEPHONE ENCOUNTER
Was the patient seen in the last year in this department? Yes     Does patient have an active prescription for medications requested? No     Received Request Via: Pharmacy      Pt met protocol?: Yes pt last ov 4/2018   BP Readings from Last 1 Encounters:   04/23/18 130/86

## 2018-05-30 RX ORDER — FUROSEMIDE 20 MG/1
TABLET ORAL
Qty: 90 TAB | Refills: 1 | Status: SHIPPED | OUTPATIENT
Start: 2018-05-30 | End: 2019-01-14 | Stop reason: SDUPTHER

## 2018-05-30 NOTE — TELEPHONE ENCOUNTER
Was the patient seen in the last year in this department? Yes     Does patient have an active prescription for medications requested? No     Received Request Via: Pharmacy      Pt met protocol?: Yes, last ov 4/23/18  BP Readings from Last 1 Encounters:   04/23/18 130/86

## 2018-06-14 ENCOUNTER — HOSPITAL ENCOUNTER (OUTPATIENT)
Dept: LAB | Facility: MEDICAL CENTER | Age: 83
End: 2018-06-14
Attending: INTERNAL MEDICINE
Payer: MEDICARE

## 2018-06-14 LAB
ALBUMIN SERPL BCP-MCNC: 4 G/DL (ref 3.2–4.9)
ALBUMIN/GLOB SERPL: 1.3 G/DL
ALP SERPL-CCNC: 55 U/L (ref 30–99)
ALT SERPL-CCNC: 17 U/L (ref 2–50)
ANION GAP SERPL CALC-SCNC: 7 MMOL/L (ref 0–11.9)
AST SERPL-CCNC: 18 U/L (ref 12–45)
BASOPHILS # BLD AUTO: 0.6 % (ref 0–1.8)
BASOPHILS # BLD: 0.03 K/UL (ref 0–0.12)
BILIRUB SERPL-MCNC: 0.6 MG/DL (ref 0.1–1.5)
BUN SERPL-MCNC: 35 MG/DL (ref 8–22)
CALCIUM SERPL-MCNC: 9.5 MG/DL (ref 8.5–10.5)
CHLORIDE SERPL-SCNC: 109 MMOL/L (ref 96–112)
CHOLEST SERPL-MCNC: 135 MG/DL (ref 100–199)
CO2 SERPL-SCNC: 24 MMOL/L (ref 20–33)
CREAT SERPL-MCNC: 2.12 MG/DL (ref 0.5–1.4)
EOSINOPHIL # BLD AUTO: 0.09 K/UL (ref 0–0.51)
EOSINOPHIL NFR BLD: 1.8 % (ref 0–6.9)
ERYTHROCYTE [DISTWIDTH] IN BLOOD BY AUTOMATED COUNT: 50.7 FL (ref 35.9–50)
GLOBULIN SER CALC-MCNC: 3.1 G/DL (ref 1.9–3.5)
GLUCOSE SERPL-MCNC: 117 MG/DL (ref 65–99)
HCT VFR BLD AUTO: 38.8 % (ref 42–52)
HDLC SERPL-MCNC: 42 MG/DL
HGB BLD-MCNC: 12.7 G/DL (ref 14–18)
IMM GRANULOCYTES # BLD AUTO: 0.01 K/UL (ref 0–0.11)
IMM GRANULOCYTES NFR BLD AUTO: 0.2 % (ref 0–0.9)
LDLC SERPL CALC-MCNC: 61 MG/DL
LYMPHOCYTES # BLD AUTO: 1.61 K/UL (ref 1–4.8)
LYMPHOCYTES NFR BLD: 32.5 % (ref 22–41)
MCH RBC QN AUTO: 33.4 PG (ref 27–33)
MCHC RBC AUTO-ENTMCNC: 32.7 G/DL (ref 33.7–35.3)
MCV RBC AUTO: 102.1 FL (ref 81.4–97.8)
MONOCYTES # BLD AUTO: 0.43 K/UL (ref 0–0.85)
MONOCYTES NFR BLD AUTO: 8.7 % (ref 0–13.4)
NEUTROPHILS # BLD AUTO: 2.79 K/UL (ref 1.82–7.42)
NEUTROPHILS NFR BLD: 56.2 % (ref 44–72)
NRBC # BLD AUTO: 0 K/UL
NRBC BLD-RTO: 0 /100 WBC
PLATELET # BLD AUTO: 144 K/UL (ref 164–446)
PMV BLD AUTO: 10.8 FL (ref 9–12.9)
POTASSIUM SERPL-SCNC: 4.5 MMOL/L (ref 3.6–5.5)
PROT SERPL-MCNC: 7.1 G/DL (ref 6–8.2)
RBC # BLD AUTO: 3.8 M/UL (ref 4.7–6.1)
SODIUM SERPL-SCNC: 140 MMOL/L (ref 135–145)
T4 FREE SERPL-MCNC: 0.86 NG/DL (ref 0.53–1.43)
TRIGL SERPL-MCNC: 161 MG/DL (ref 0–149)
TSH SERPL DL<=0.005 MIU/L-ACNC: 3.14 UIU/ML (ref 0.38–5.33)
WBC # BLD AUTO: 5 K/UL (ref 4.8–10.8)

## 2018-06-14 PROCEDURE — 80061 LIPID PANEL: CPT

## 2018-06-14 PROCEDURE — 36415 COLL VENOUS BLD VENIPUNCTURE: CPT

## 2018-06-14 PROCEDURE — 80053 COMPREHEN METABOLIC PANEL: CPT

## 2018-06-14 PROCEDURE — 84443 ASSAY THYROID STIM HORMONE: CPT

## 2018-06-14 PROCEDURE — 84439 ASSAY OF FREE THYROXINE: CPT

## 2018-06-14 PROCEDURE — 85025 COMPLETE CBC W/AUTO DIFF WBC: CPT

## 2018-06-21 ENCOUNTER — TELEPHONE (OUTPATIENT)
Dept: MEDICAL GROUP | Facility: PHYSICIAN GROUP | Age: 83
End: 2018-06-21

## 2018-06-21 DIAGNOSIS — E78.5 HYPERLIPIDEMIA, UNSPECIFIED HYPERLIPIDEMIA TYPE: ICD-10-CM

## 2018-06-21 DIAGNOSIS — I10 HYPERTENSION, UNSPECIFIED TYPE: ICD-10-CM

## 2018-06-21 DIAGNOSIS — I25.10 ATHEROSCLEROSIS OF NATIVE CORONARY ARTERY OF NATIVE HEART, ANGINA PRESENCE UNSPECIFIED: ICD-10-CM

## 2018-06-21 RX ORDER — EZETIMIBE 10 MG/1
TABLET ORAL
Qty: 90 TAB | Refills: 1 | Status: SHIPPED | OUTPATIENT
Start: 2018-06-21 | End: 2019-01-14 | Stop reason: SDUPTHER

## 2018-06-21 NOTE — TELEPHONE ENCOUNTER
Was the patient seen in the last year in this department? Yes     Does patient have an active prescription for medications requested? No     Received Request Via: Pharmacy    Pt met protocol?: Yes     Last OV 04/2018  No results found for: HBA1C       Lab Results  Component Value Date/Time   CHOLSTRLTOT 135 06/14/2018 0921   TRIGLYCERIDE 161 (H) 06/14/2018 0921   HDL 42 06/14/2018 0921   LDL 61 06/14/2018 0921

## 2018-06-21 NOTE — TELEPHONE ENCOUNTER
1. Caller Name: [facility]                                         Call Back Number: [phone]      Patient approves a detailed voicemail message: N\A    2. SPECIFIC Action To Be Taken: Referral pending, please sign.    3. Diagnosis/Clinical Reason for Request: I25.10, I10, E78.2    4. Specialty & Provider Name/Lab/Imaging Location: Rao Mendes MD continuation of care    5. Is appointment scheduled for requested order/referral: no    Patient was not informed they will receive a return phone call from the office ONLY if there are any questions before processing their request. Advised to call back if they haven't received a call from the referral department in 5 days.

## 2018-06-21 NOTE — TELEPHONE ENCOUNTER
Pt has had OV within the 12 month protocol and lipid panel is current. 6 month supply sent to pharmacy.   Lab Results   Component Value Date/Time    CHOLSTRLTOT 135 06/14/2018 09:21 AM    LDL 61 06/14/2018 09:21 AM    HDL 42 06/14/2018 09:21 AM    TRIGLYCERIDE 161 (H) 06/14/2018 09:21 AM       Lab Results   Component Value Date/Time    SODIUM 140 06/14/2018 09:21 AM    POTASSIUM 4.5 06/14/2018 09:21 AM    CHLORIDE 109 06/14/2018 09:21 AM    CO2 24 06/14/2018 09:21 AM    GLUCOSE 117 (H) 06/14/2018 09:21 AM    BUN 35 (H) 06/14/2018 09:21 AM    CREATININE 2.12 (H) 06/14/2018 09:21 AM     Lab Results   Component Value Date/Time    ALKPHOSPHAT 55 06/14/2018 09:21 AM    ASTSGOT 18 06/14/2018 09:21 AM    ALTSGPT 17 06/14/2018 09:21 AM    TBILIRUBIN 0.6 06/14/2018 09:21 AM

## 2018-08-06 DIAGNOSIS — I10 ESSENTIAL HYPERTENSION, BENIGN: Chronic | ICD-10-CM

## 2018-08-06 RX ORDER — LOSARTAN POTASSIUM 100 MG/1
100 TABLET ORAL DAILY
Qty: 90 TAB | Refills: 0 | Status: SHIPPED | OUTPATIENT
Start: 2018-08-06 | End: 2018-10-29 | Stop reason: SDUPTHER

## 2018-08-06 NOTE — TELEPHONE ENCOUNTER
1. Caller Name: Pharmacy    [facility]                                    Call Back Number: [phone]      Patient approves a detailed voicemail message: N\A    The Medication Valsartan has been recall can you please advise of the medication change. Thank you

## 2018-08-30 RX ORDER — PRAVASTATIN SODIUM 10 MG
TABLET ORAL
Qty: 90 TAB | Refills: 1 | Status: SHIPPED | OUTPATIENT
Start: 2018-08-30 | End: 2019-01-10

## 2018-10-17 ENCOUNTER — HOSPITAL ENCOUNTER (OUTPATIENT)
Dept: LAB | Facility: MEDICAL CENTER | Age: 83
End: 2018-10-17
Attending: INTERNAL MEDICINE
Payer: MEDICARE

## 2018-10-17 LAB
ALBUMIN SERPL BCP-MCNC: 3.8 G/DL (ref 3.2–4.9)
ALBUMIN/GLOB SERPL: 1.3 G/DL
ALP SERPL-CCNC: 49 U/L (ref 30–99)
ALT SERPL-CCNC: 19 U/L (ref 2–50)
ANION GAP SERPL CALC-SCNC: 8 MMOL/L (ref 0–11.9)
AST SERPL-CCNC: 15 U/L (ref 12–45)
BILIRUB SERPL-MCNC: 0.6 MG/DL (ref 0.1–1.5)
BUN SERPL-MCNC: 38 MG/DL (ref 8–22)
CALCIUM SERPL-MCNC: 9.3 MG/DL (ref 8.5–10.5)
CHLORIDE SERPL-SCNC: 108 MMOL/L (ref 96–112)
CHOLEST SERPL-MCNC: 120 MG/DL (ref 100–199)
CO2 SERPL-SCNC: 25 MMOL/L (ref 20–33)
CREAT SERPL-MCNC: 2.07 MG/DL (ref 0.5–1.4)
FASTING STATUS PATIENT QL REPORTED: NORMAL
GLOBULIN SER CALC-MCNC: 2.9 G/DL (ref 1.9–3.5)
GLUCOSE SERPL-MCNC: 108 MG/DL (ref 65–99)
HDLC SERPL-MCNC: 41 MG/DL
LDLC SERPL CALC-MCNC: 44 MG/DL
POTASSIUM SERPL-SCNC: 4.5 MMOL/L (ref 3.6–5.5)
PROT SERPL-MCNC: 6.7 G/DL (ref 6–8.2)
SODIUM SERPL-SCNC: 141 MMOL/L (ref 135–145)
TRIGL SERPL-MCNC: 177 MG/DL (ref 0–149)

## 2018-10-17 PROCEDURE — 80061 LIPID PANEL: CPT

## 2018-10-17 PROCEDURE — 80053 COMPREHEN METABOLIC PANEL: CPT

## 2018-10-17 PROCEDURE — 36415 COLL VENOUS BLD VENIPUNCTURE: CPT

## 2018-10-22 ENCOUNTER — HOSPITAL ENCOUNTER (EMERGENCY)
Dept: HOSPITAL 8 - ED | Age: 83
Discharge: HOME | End: 2018-10-22
Payer: MEDICARE

## 2018-10-22 VITALS — DIASTOLIC BLOOD PRESSURE: 51 MMHG | SYSTOLIC BLOOD PRESSURE: 136 MMHG

## 2018-10-22 VITALS — WEIGHT: 224.87 LBS | BODY MASS INDEX: 32.19 KG/M2 | HEIGHT: 70 IN

## 2018-10-22 DIAGNOSIS — Z87.891: ICD-10-CM

## 2018-10-22 DIAGNOSIS — N18.9: ICD-10-CM

## 2018-10-22 DIAGNOSIS — E78.5: ICD-10-CM

## 2018-10-22 DIAGNOSIS — I25.10: ICD-10-CM

## 2018-10-22 DIAGNOSIS — M75.112: ICD-10-CM

## 2018-10-22 DIAGNOSIS — I12.9: ICD-10-CM

## 2018-10-22 DIAGNOSIS — M19.012: Primary | ICD-10-CM

## 2018-10-22 LAB
ALBUMIN SERPL-MCNC: 3.5 G/DL (ref 3.4–5)
ANION GAP SERPL CALC-SCNC: 6 MMOL/L (ref 5–15)
BASOPHILS # BLD AUTO: 0.07 X10^3/UL (ref 0–0.1)
BASOPHILS NFR BLD AUTO: 1 % (ref 0–1)
CALCIUM SERPL-MCNC: 9.4 MG/DL (ref 8.5–10.1)
CHLORIDE SERPL-SCNC: 112 MMOL/L (ref 98–107)
CREAT SERPL-MCNC: 1.94 MG/DL (ref 0.7–1.3)
EOSINOPHIL # BLD AUTO: 0.07 X10^3/UL (ref 0–0.4)
EOSINOPHIL NFR BLD AUTO: 1 % (ref 1–7)
ERYTHROCYTE [DISTWIDTH] IN BLOOD BY AUTOMATED COUNT: 13.9 % (ref 9.4–14.8)
LYMPHOCYTES # BLD AUTO: 1.21 X10^3/UL (ref 1–3.4)
LYMPHOCYTES NFR BLD AUTO: 19 % (ref 22–44)
MCH RBC QN AUTO: 33.5 PG (ref 27.5–34.5)
MCHC RBC AUTO-ENTMCNC: 33.5 G/DL (ref 33.2–36.2)
MCV RBC AUTO: 99.9 FL (ref 81–97)
MD: NO
MONOCYTES # BLD AUTO: 0.42 X10^3/UL (ref 0.2–0.8)
MONOCYTES NFR BLD AUTO: 7 % (ref 2–9)
NEUTROPHILS # BLD AUTO: 4.54 X10^3/UL (ref 1.8–6.8)
NEUTROPHILS NFR BLD AUTO: 72 % (ref 42–75)
PLATELET # BLD AUTO: 153 X10^3/UL (ref 130–400)
PMV BLD AUTO: 8.7 FL (ref 7.4–10.4)
RBC # BLD AUTO: 3.95 X10^6/UL (ref 4.38–5.82)
TROPONIN I SERPL-MCNC: 0.03 NG/ML (ref 0–0.04)

## 2018-10-22 PROCEDURE — 71045 X-RAY EXAM CHEST 1 VIEW: CPT

## 2018-10-22 PROCEDURE — 36415 COLL VENOUS BLD VENIPUNCTURE: CPT

## 2018-10-22 PROCEDURE — 80048 BASIC METABOLIC PNL TOTAL CA: CPT

## 2018-10-22 PROCEDURE — 85025 COMPLETE CBC W/AUTO DIFF WBC: CPT

## 2018-10-22 PROCEDURE — 93005 ELECTROCARDIOGRAM TRACING: CPT

## 2018-10-22 PROCEDURE — 99285 EMERGENCY DEPT VISIT HI MDM: CPT

## 2018-10-22 PROCEDURE — 84484 ASSAY OF TROPONIN QUANT: CPT

## 2018-10-22 PROCEDURE — 82040 ASSAY OF SERUM ALBUMIN: CPT

## 2018-10-29 DIAGNOSIS — I10 ESSENTIAL HYPERTENSION, BENIGN: Chronic | ICD-10-CM

## 2018-10-30 RX ORDER — AMLODIPINE BESYLATE 2.5 MG/1
TABLET ORAL
Qty: 90 TAB | Refills: 1 | Status: SHIPPED | OUTPATIENT
Start: 2018-10-30

## 2018-10-30 NOTE — TELEPHONE ENCOUNTER
Refill X 6 months, sent to pharmacy.Pt. Seen in the last 6 months per protocol.   Lab Results   Component Value Date/Time    SODIUM 141 10/17/2018 09:35 AM    POTASSIUM 4.5 10/17/2018 09:35 AM    CHLORIDE 108 10/17/2018 09:35 AM    CO2 25 10/17/2018 09:35 AM    GLUCOSE 108 (H) 10/17/2018 09:35 AM    BUN 38 (H) 10/17/2018 09:35 AM    CREATININE 2.07 (H) 10/17/2018 09:35 AM

## 2018-10-31 RX ORDER — LOSARTAN POTASSIUM 100 MG/1
TABLET ORAL
Qty: 90 TAB | Refills: 1 | Status: SHIPPED | OUTPATIENT
Start: 2018-10-31

## 2018-10-31 NOTE — TELEPHONE ENCOUNTER
Was the patient seen in the last year in this department? Yes    Does patient have an active prescription for medications requested? No     Received Request Via: Pharmacy      Pt met protocol?: Yes    LAST OV 04/23/2018    BP Readings from Last 1 Encounters:   04/23/18 130/86     Lab Results   Component Value Date/Time    CHOLSTRLTOT 120 10/17/2018 09:35 AM    LDL 44 10/17/2018 09:35 AM    HDL 41 10/17/2018 09:35 AM    TRIGLYCERIDE 177 (H) 10/17/2018 09:35 AM       Lab Results   Component Value Date/Time    SODIUM 141 10/17/2018 09:35 AM    POTASSIUM 4.5 10/17/2018 09:35 AM    CHLORIDE 108 10/17/2018 09:35 AM    CO2 25 10/17/2018 09:35 AM    GLUCOSE 108 (H) 10/17/2018 09:35 AM    BUN 38 (H) 10/17/2018 09:35 AM    CREATININE 2.07 (H) 10/17/2018 09:35 AM     Lab Results   Component Value Date/Time    ALKPHOSPHAT 49 10/17/2018 09:35 AM    ASTSGOT 15 10/17/2018 09:35 AM    ALTSGPT 19 10/17/2018 09:35 AM    TBILIRUBIN 0.6 10/17/2018 09:35 AM

## 2019-01-09 DIAGNOSIS — I25.10 CORONARY ARTERY DISEASE WITHOUT ANGINA PECTORIS, UNSPECIFIED VESSEL OR LESION TYPE, UNSPECIFIED WHETHER NATIVE OR TRANSPLANTED HEART: ICD-10-CM

## 2019-01-09 RX ORDER — CARVEDILOL 3.12 MG/1
3.12 TABLET ORAL 2 TIMES DAILY WITH MEALS
Qty: 60 TAB | Status: CANCELLED | OUTPATIENT
Start: 2019-01-09

## 2019-01-09 RX ORDER — ATORVASTATIN CALCIUM 40 MG/1
40 TABLET, FILM COATED ORAL
Qty: 30 TAB | Status: CANCELLED | OUTPATIENT
Start: 2019-01-09

## 2019-01-10 RX ORDER — CARVEDILOL 3.12 MG/1
3.12 TABLET ORAL 2 TIMES DAILY WITH MEALS
Qty: 180 TAB | Refills: 1 | Status: SHIPPED | OUTPATIENT
Start: 2019-01-10 | End: 2019-07-14 | Stop reason: SDUPTHER

## 2019-01-10 RX ORDER — ATORVASTATIN CALCIUM 40 MG/1
40 TABLET, FILM COATED ORAL DAILY
Qty: 90 TAB | Refills: 1 | Status: SHIPPED | OUTPATIENT
Start: 2019-01-10 | End: 2019-04-10 | Stop reason: SDUPTHER

## 2019-01-10 NOTE — TELEPHONE ENCOUNTER
Dr Kathie Tan and Lipitor was originally prescribed by a Dr. Torrez at [facility] and is not on pt's med list. I'm not sure if pt was recently hospitalized. It was prescribed 11/21/18. Please advise.

## 2019-01-10 NOTE — TELEPHONE ENCOUNTER
Phone Number Called: [phone] (home)     Message: Left pt voicemail to call back tried to inform pt about rx request     Left Message for patient to call back: N\A

## 2019-01-10 NOTE — TELEPHONE ENCOUNTER
Can we clarify if he's on these medications - coreg and atorvastatin, and which doses? They weren't on his med list when he established. Is he taking pravastatin (our list) or coreg (requested)?

## 2019-01-14 RX ORDER — EZETIMIBE 10 MG/1
TABLET ORAL
Qty: 90 TAB | Refills: 1 | Status: SHIPPED | OUTPATIENT
Start: 2019-01-14 | End: 2019-07-15 | Stop reason: SDUPTHER

## 2019-01-14 RX ORDER — FUROSEMIDE 20 MG/1
TABLET ORAL
Qty: 90 TAB | Refills: 1 | Status: SHIPPED | OUTPATIENT
Start: 2019-01-14 | End: 2019-02-27

## 2019-01-14 NOTE — TELEPHONE ENCOUNTER
Was the patient seen in the last year in this department? Yes    Does patient have an active prescription for medications requested? No     Received Request Via: Pharmacy      Pt met protocol?: Yes, OV 4/18

## 2019-01-14 NOTE — TELEPHONE ENCOUNTER
*PT WILL NEED APPT SOON. PHARMACY REQUESTING LASIX 40MG V.S. LASIX 20MG WHICH IS WHAT'S ACTIVE ON PT PROFILE. PLEASE CLARIFY*  Was the patient seen in the last year in this department? Yes    Does patient have an active prescription for medications requested? No     Received Request Via: Pharmacy      Pt met protocol?: Yes    LAST OV 04/23/2018    BP Readings from Last 1 Encounters:   04/23/18 130/86     Lab Results   Component Value Date/Time    CHOLSTRLTOT 120 10/17/2018 09:35 AM    LDL 44 10/17/2018 09:35 AM    HDL 41 10/17/2018 09:35 AM    TRIGLYCERIDE 177 (H) 10/17/2018 09:35 AM       Lab Results   Component Value Date/Time    SODIUM 141 10/17/2018 09:35 AM    POTASSIUM 4.5 10/17/2018 09:35 AM    CHLORIDE 108 10/17/2018 09:35 AM    CO2 25 10/17/2018 09:35 AM    GLUCOSE 108 (H) 10/17/2018 09:35 AM    BUN 38 (H) 10/17/2018 09:35 AM    CREATININE 2.07 (H) 10/17/2018 09:35 AM     Lab Results   Component Value Date/Time    ALKPHOSPHAT 49 10/17/2018 09:35 AM    ASTSGOT 15 10/17/2018 09:35 AM    ALTSGPT 19 10/17/2018 09:35 AM    TBILIRUBIN 0.6 10/17/2018 09:35 AM

## 2019-01-15 NOTE — TELEPHONE ENCOUNTER
Pt has had OV within the 12 month protocol and lipid panel is current. 6 month supply sent to pharmacy.   Lab Results   Component Value Date/Time    CHOLSTRLTOT 120 10/17/2018 09:35 AM    LDL 44 10/17/2018 09:35 AM    HDL 41 10/17/2018 09:35 AM    TRIGLYCERIDE 177 (H) 10/17/2018 09:35 AM       Lab Results   Component Value Date/Time    SODIUM 141 10/17/2018 09:35 AM    POTASSIUM 4.5 10/17/2018 09:35 AM    CHLORIDE 108 10/17/2018 09:35 AM    CO2 25 10/17/2018 09:35 AM    GLUCOSE 108 (H) 10/17/2018 09:35 AM    BUN 38 (H) 10/17/2018 09:35 AM    CREATININE 2.07 (H) 10/17/2018 09:35 AM     Lab Results   Component Value Date/Time    ALKPHOSPHAT 49 10/17/2018 09:35 AM    ASTSGOT 15 10/17/2018 09:35 AM    ALTSGPT 19 10/17/2018 09:35 AM    TBILIRUBIN 0.6 10/17/2018 09:35 AM

## 2019-01-17 ENCOUNTER — PATIENT OUTREACH (OUTPATIENT)
Dept: HEALTH INFORMATION MANAGEMENT | Facility: OTHER | Age: 84
End: 2019-01-17

## 2019-01-17 NOTE — PROGRESS NOTES
Patient called because he received a call from [facility] and scheduled his follow up visit with his provider. Stated he had received care from [facility] for stent placement within the time frame of last visit from his cardiologist. Patient also stated he received vaccines at [facility]. Requesting records from the providers the patient last saw at [facility].   Web Iz  Influenza w/preserv.   Tdap   Zoster Sathish (Shingrix)

## 2019-02-26 ENCOUNTER — TELEPHONE (OUTPATIENT)
Dept: MEDICAL GROUP | Facility: PHYSICIAN GROUP | Age: 84
End: 2019-02-26

## 2019-02-26 NOTE — TELEPHONE ENCOUNTER
Future Appointments       Provider Department Center    2/27/2019 10:55 AM Sean Jurado M.D. [facility]        ESTABLISHED PATIENT PRE-VISIT PLANNING     Patient was NOT contacted to complete PVP.       1.  Reviewed notes from the last few office visits within the medical group: Yes    2.  If any orders were placed at last visit or intended to be done for this visit (i.e. 6 mos follow-up), do we have Results/Consult Notes?        •  Labs - Labs ordered, completed on 10/17/2018 and results are in chart.       •  Imaging - Imaging was not ordered at last office visit.       •  Referrals - No referrals were ordered at last office visit.    3. Is this appointment scheduled as a Hospital Follow-Up? No    4.  Immunizations were updated in [facility] using WebIZ?: Yes       •  Web Iz Recommendations: FLU, TDAP and SHINGRIX (Shingles)    5.  Patient is due for the following Health Maintenance Topics:   Health Maintenance Due   Topic Date Due   • Annual Wellness Visit  04/06/1930   • IMM DTaP/Tdap/Td Vaccine (1 - Tdap) 04/06/1949   • IMM ZOSTER VACCINES (1 of 2) 04/06/1980   • IMM INFLUENZA (1) 09/01/2018     6. Orders for overdue Health Maintenance topics pended in Pre-Charting? NO    7.  AHA (MDX) form printed for Provider? YES    8.  Patient was NOT informed to arrive 15 min prior to their scheduled appointment and bring in their medication bottles.

## 2019-02-27 ENCOUNTER — OFFICE VISIT (OUTPATIENT)
Dept: MEDICAL GROUP | Facility: PHYSICIAN GROUP | Age: 84
End: 2019-02-27
Payer: MEDICARE

## 2019-02-27 VITALS
RESPIRATION RATE: 16 BRPM | BODY MASS INDEX: 30.29 KG/M2 | DIASTOLIC BLOOD PRESSURE: 62 MMHG | SYSTOLIC BLOOD PRESSURE: 102 MMHG | WEIGHT: 211.6 LBS | TEMPERATURE: 98.6 F | HEIGHT: 70 IN | HEART RATE: 49 BPM | OXYGEN SATURATION: 96 %

## 2019-02-27 DIAGNOSIS — R13.10 DYSPHAGIA, UNSPECIFIED TYPE: ICD-10-CM

## 2019-02-27 DIAGNOSIS — I25.10 CORONARY ARTERY DISEASE INVOLVING NATIVE HEART, ANGINA PRESENCE UNSPECIFIED, UNSPECIFIED VESSEL OR LESION TYPE: ICD-10-CM

## 2019-02-27 DIAGNOSIS — N18.30 CKD (CHRONIC KIDNEY DISEASE) STAGE 3, GFR 30-59 ML/MIN (HCC): ICD-10-CM

## 2019-02-27 PROCEDURE — 8041 PR SCP AHA: Performed by: INTERNAL MEDICINE

## 2019-02-27 PROCEDURE — 99213 OFFICE O/P EST LOW 20 MIN: CPT | Performed by: INTERNAL MEDICINE

## 2019-02-27 RX ORDER — FUROSEMIDE 40 MG/1
20 TABLET ORAL
COMMUNITY
Start: 2018-12-07 | End: 2019-02-27

## 2019-02-27 RX ORDER — FUROSEMIDE 20 MG/1
20 TABLET ORAL 2 TIMES DAILY
COMMUNITY

## 2019-02-27 ASSESSMENT — PATIENT HEALTH QUESTIONNAIRE - PHQ9: CLINICAL INTERPRETATION OF PHQ2 SCORE: 0

## 2019-02-27 NOTE — ASSESSMENT & PLAN NOTE
Has a history of 2 stents and just recently had another PCI 11/2018. Has seen Dr. Mendes since and hasn't had any recurrent angina. Last saw his cardiologist 12/2018.

## 2019-02-27 NOTE — ASSESSMENT & PLAN NOTE
He chokes when he swallows. Has to cut his food into smaller pieces. Does aspirate at times while trying to swallow.

## 2019-02-27 NOTE — PROGRESS NOTES
Annual Health Assessment Questions:    1.  Are you currently engaging in any exercise or physical activity? No    2.  How would you describe your mood or emotional well-being today? good    3.  Have you had any falls in the last year? No    4.  Have you noticed any problems with your balance or had difficulty walking? Yes    5.  In the last six months have you experienced any leakage of urine? Yes    6. DPA/Advanced Directive: Patient does not have an Advanced Directive.  A packet and workshop information was given on Advanced Directives.     PRIMARY CARE CLINIC FOLLOW UP VISIT  Chief Complaint   Patient presents with   • Coronary Artery Disease   • Hypertension   • Hyperlipidemia   • Chronic Kidney Disease   • Gastrophageal Reflux     History of Present Illness     CKD (chronic kidney disease) stage 3, GFR 30-59 ml/min  Chronic, stable based on most recent labs 10/2018.     CAD (coronary artery disease)  Has a history of 2 stents and just recently had another PCI 11/2018. Has seen Dr. Mendes since and hasn't had any recurrent angina. Last saw his cardiologist 12/2018.     Dysphagia  He chokes when he swallows. Has to cut his food into smaller pieces. Does aspirate at times while trying to swallow.     Current Outpatient Prescriptions   Medication Sig Dispense Refill   • furosemide (LASIX) 20 MG Tab Take 20 mg by mouth 2 times a day.     • ezetimibe (ZETIA) 10 MG Tab TAKE 1 TABLET BY MOUTH ONCE DAILY 90 Tab 1   • carvedilol (COREG) 3.125 MG Tab Take 1 Tab by mouth 2 times a day, with meals. 180 Tab 1   • atorvastatin (LIPITOR) 40 MG Tab Take 1 Tab by mouth every day. 90 Tab 1   • losartan (COZAAR) 100 MG Tab TAKE 1 TABLET BY MOUTH ONCE DAILY 90 Tab 1   • amLODIPine (NORVASC) 2.5 MG Tab TAKE 1 TABLET BY MOUTH ONCE DAILY 90 Tab 1   • clopidogrel (PLAVIX) 75 MG Tab TAKE ONE TABLET BY MOUTH ONCE DAILY 90 Tab 3   • triamcinolone acetonide (KENALOG) 0.1 % Cream Apply  Topically to affected area twice daily 1 Tube 0   •  mupirocin calcium (BACTROBAN) 2 % Cream Apply small amount to skin as directed 2 times daily as directed. 1 Tube 2   • Non Formulary Request Systane eye gtts, besivance eye gtts, tradnisolone eye gtts, ketorolac eye gtts two to three times a day     • nitroglycerin (NITROSTAT) 0.4 MG SUBL Place 0.4 mg under tongue every 5 minutes as needed.     • omeprazole (PRILOSEC) 20 MG CPDR One 1/2 hr before dinner 30 Cap 0   • Multiple Vitamins-Minerals (CENTRUM SILVER PO) Take  by mouth every day.     • docosahexanoic acid (FISH OIL) 1000 MG CAPS Take 1,000 mg by mouth 2 Times a Day.     • Homeopathic Products (SIMILASAN ALLERGY EYE RELIEF OP) by Ophthalmic route 3 times a day.       No current facility-administered medications for this visit.      Past Medical History:   Diagnosis Date   • Arrhythmia    • Arthritis    • Basal cell carcinoma of brow 9/2/2014    Referred to dermatologist Dr. Mcdowell    • BMI 31.0-31.9,adult 2/25/2015   • CAD (coronary artery disease)    • Cataract    • GERD (gastroesophageal reflux disease) 2/25/2015   • Hyperlipidemia 12/1/2011   • Hypertension    • Stented coronary artery      Past Surgical History:   Procedure Laterality Date   • ESOPHAGOSCOPY N/A 2/21/2018    Procedure: ESOPHAGOSCOPY- BIOPSY AS NEEDED;  Surgeon: Allan Latham M.D.;  Location: SURGERY SAME DAY [facility]S;  Service: Ent   • LESION EXCISION GENERAL  2/21/2018    Procedure: LESION EXCISION GENERAL- TONGUE ;  Surgeon: Allan Latham M.D.;  Location: SURGERY SAME DAY [facility] OR[facility];  Service: Ent   • LARYNGOSCOPY N/A 2/21/2018    Procedure: LARYNGOSCOPY- DIRECT;  Surgeon: Allan Latham M.D.;  Location: SURGERY SAME DAY [facility] OR[facility];  Service: Ent   • CATARACT PHACO WITH IOL  9/23/2014    Performed by Fei Zapata M.D. at SURGERY SURGICAL [facility] ORS   • CATARACT PHACO WITH IOL  8/26/2014    Performed by Fei Zap[place] M.D. at [place] SUR[facility]S   • ANGIOPLASTY BALLOON     • APPENDECTOMY     • EYE SURGERY    "    Social History   Substance Use Topics   • Smoking status: Former Smoker     Packs/day: 0.50     Years: 20.00     Types: Cigarettes     Quit date: 10/25/1972   • Smokeless tobacco: Never Used   • Alcohol use No      Comment: quit 2015     Social History     Social History Narrative    [employer]       No family history on file.  Family Status   Relation Status   • Mo    • Fa    • Bro      Allergies: Nkda [no known drug allergy]    ROS  As per HPI above. All other systems reviewed and negative.        Objective   Blood pressure 102/62, pulse (!) 49, temperature 37 °C (98.6 °F), resp. rate 16, height 1.778 m (5' 10\"), weight 96 kg (211 lb 9.6 oz), SpO2 96 %. Body mass index is 30.36 kg/m².    General: alert and oriented, pleasant, cooperative  HEENT: Normocephalic, atraumatic. No thyroid masses. Oropharynx clear without exudate or injection.   Cardiovascular: regular rate and rhythm, normal S1/S2  Pulmonary: lungs clear to auscultation bilaterally  Extremities: minimal bilateral trace lower extremity edema   Psychiatric: appropriate mood and affect. Good insight and appropriate judgment     Assessment and Plan   The following treatment plan was discussed     1. CKD (chronic kidney disease) stage 3, GFR 30-59 ml/min (HCC)  Chronic, stable. Will continue to monitor on routine labs.     2. Coronary artery disease involving native heart, angina presence unspecified, unspecified vessel or lesion type  Emerson now has 3 stents in place. He is following with his cardiologist and last saw him 2018 and denies angina, is stable presently.     3. Dysphagia, unspecified type  I offered a further work up including a barium swallow but he declined for now.     Healthcare maintenance     Health Maintenance Due   Topic Date Due   • Annual Wellness Visit  1930   • IMM DTaP/Tdap/Td Vaccine (1 - Tdap) 1949   • IMM INFLUENZA (1) 2018     Return in about 6 months (around 2019).    Sean " Rhiannon CAZARES  Internal Medicine  [facility]

## 2019-03-06 ENCOUNTER — HOSPITAL ENCOUNTER (OUTPATIENT)
Dept: LAB | Facility: MEDICAL CENTER | Age: 84
End: 2019-03-06
Attending: INTERNAL MEDICINE
Payer: MEDICARE

## 2019-03-06 LAB
ALBUMIN SERPL BCP-MCNC: 3.9 G/DL (ref 3.2–4.9)
ALBUMIN/GLOB SERPL: 1.6 G/DL
ALP SERPL-CCNC: 60 U/L (ref 30–99)
ALT SERPL-CCNC: 24 U/L (ref 2–50)
ANION GAP SERPL CALC-SCNC: 5 MMOL/L (ref 0–11.9)
AST SERPL-CCNC: 19 U/L (ref 12–45)
BILIRUB SERPL-MCNC: 0.5 MG/DL (ref 0.1–1.5)
BNP SERPL-MCNC: 175 PG/ML (ref 0–100)
BUN SERPL-MCNC: 33 MG/DL (ref 8–22)
CALCIUM SERPL-MCNC: 10.2 MG/DL (ref 8.5–10.5)
CHLORIDE SERPL-SCNC: 111 MMOL/L (ref 96–112)
CHOLEST SERPL-MCNC: 77 MG/DL (ref 100–199)
CO2 SERPL-SCNC: 24 MMOL/L (ref 20–33)
CREAT SERPL-MCNC: 1.72 MG/DL (ref 0.5–1.4)
FASTING STATUS PATIENT QL REPORTED: NORMAL
GLOBULIN SER CALC-MCNC: 2.5 G/DL (ref 1.9–3.5)
GLUCOSE SERPL-MCNC: 119 MG/DL (ref 65–99)
HDLC SERPL-MCNC: 34 MG/DL
LDLC SERPL CALC-MCNC: 20 MG/DL
POTASSIUM SERPL-SCNC: 4.3 MMOL/L (ref 3.6–5.5)
PROT SERPL-MCNC: 6.4 G/DL (ref 6–8.2)
SODIUM SERPL-SCNC: 140 MMOL/L (ref 135–145)
TRIGL SERPL-MCNC: 116 MG/DL (ref 0–149)

## 2019-03-06 PROCEDURE — 36415 COLL VENOUS BLD VENIPUNCTURE: CPT

## 2019-03-06 PROCEDURE — 80061 LIPID PANEL: CPT

## 2019-03-06 PROCEDURE — 83880 ASSAY OF NATRIURETIC PEPTIDE: CPT

## 2019-03-06 PROCEDURE — 80053 COMPREHEN METABOLIC PANEL: CPT

## 2019-04-10 ENCOUNTER — TELEPHONE (OUTPATIENT)
Dept: MEDICAL GROUP | Facility: PHYSICIAN GROUP | Age: 84
End: 2019-04-10

## 2019-04-10 DIAGNOSIS — I25.10 CORONARY ARTERY DISEASE WITHOUT ANGINA PECTORIS, UNSPECIFIED VESSEL OR LESION TYPE, UNSPECIFIED WHETHER NATIVE OR TRANSPLANTED HEART: ICD-10-CM

## 2019-04-10 NOTE — TELEPHONE ENCOUNTER
*PER NEW INS PROTOCOL, NEEDS TO BE DONE FOR 100DAYS SUPPLY*    Was the patient seen in the last year in this department? Yes    Does patient have an active prescription for medications requested? Yes    Received Request Via: Pharmacy      Pt met protocol?: Yes     LAST OV 02/27/2019

## 2019-04-12 RX ORDER — ATORVASTATIN CALCIUM 40 MG/1
40 TABLET, FILM COATED ORAL DAILY
Qty: 100 TAB | Refills: 3 | Status: SHIPPED | OUTPATIENT
Start: 2019-04-12 | End: 2019-09-03

## 2019-04-12 NOTE — TELEPHONE ENCOUNTER
Pt has had OV within the 12 month protocol and lipid panel is current. 12 month supply sent to pharmacy.   Lab Results   Component Value Date/Time    CHOLSTRLTOT 77 (L) 03/06/2019 09:02 AM    LDL 20 03/06/2019 09:02 AM    HDL 34 (A) 03/06/2019 09:02 AM    TRIGLYCERIDE 116 03/06/2019 09:02 AM       Lab Results   Component Value Date/Time    SODIUM 140 03/06/2019 09:02 AM    POTASSIUM 4.3 03/06/2019 09:02 AM    CHLORIDE 111 03/06/2019 09:02 AM    CO2 24 03/06/2019 09:02 AM    GLUCOSE 119 (H) 03/06/2019 09:02 AM    BUN 33 (H) 03/06/2019 09:02 AM    CREATININE 1.72 (H) 03/06/2019 09:02 AM     Lab Results   Component Value Date/Time    ALKPHOSPHAT 60 03/06/2019 09:02 AM    ASTSGOT 19 03/06/2019 09:02 AM    ALTSGPT 24 03/06/2019 09:02 AM    TBILIRUBIN 0.5 03/06/2019 09:02 AM

## 2019-06-13 DIAGNOSIS — I25.10 CORONARY ARTERY DISEASE WITHOUT ANGINA PECTORIS, UNSPECIFIED VESSEL OR LESION TYPE, UNSPECIFIED WHETHER NATIVE OR TRANSPLANTED HEART: ICD-10-CM

## 2019-06-13 NOTE — TELEPHONE ENCOUNTER
Was the patient seen in the last year in this department? Yes    Does patient have an active prescription for medications requested? No     Received Request Via: Pharmacy      Pt met protocol?: Yes last ov 2/19    Lab Results  Component Value Date/Time   CHOLSTRLTOT 77 (L) 03/06/2019 0902   TRIGLYCERIDE 116 03/06/2019 0902   HDL 34 (A) 03/06/2019 0902   LDL 20 03/06/2019 0902

## 2019-06-14 ENCOUNTER — PATIENT OUTREACH (OUTPATIENT)
Dept: HEALTH INFORMATION MANAGEMENT | Facility: OTHER | Age: 84
End: 2019-06-14

## 2019-06-14 RX ORDER — CLOPIDOGREL BISULFATE 75 MG/1
TABLET ORAL
Qty: 90 TAB | Refills: 0 | Status: SHIPPED | OUTPATIENT
Start: 2019-06-14 | End: 2019-09-13 | Stop reason: SDUPTHER

## 2019-06-14 NOTE — PROGRESS NOTES
Outcome: Left Message    Please transfer to Patient Outreach Team at [phone] when patient returns call.    [facility] Verified: yes    Attempt # 1

## 2019-07-14 DIAGNOSIS — I25.10 CORONARY ARTERY DISEASE WITHOUT ANGINA PECTORIS, UNSPECIFIED VESSEL OR LESION TYPE, UNSPECIFIED WHETHER NATIVE OR TRANSPLANTED HEART: ICD-10-CM

## 2019-07-15 ENCOUNTER — HOSPITAL ENCOUNTER (OUTPATIENT)
Dept: LAB | Facility: MEDICAL CENTER | Age: 84
End: 2019-07-15
Attending: INTERNAL MEDICINE
Payer: MEDICARE

## 2019-07-15 LAB
ALBUMIN SERPL BCP-MCNC: 4 G/DL (ref 3.2–4.9)
ALBUMIN/GLOB SERPL: 1.5 G/DL
ALP SERPL-CCNC: 55 U/L (ref 30–99)
ALT SERPL-CCNC: 20 U/L (ref 2–50)
ANION GAP SERPL CALC-SCNC: 9 MMOL/L (ref 0–11.9)
AST SERPL-CCNC: 17 U/L (ref 12–45)
BILIRUB SERPL-MCNC: 0.8 MG/DL (ref 0.1–1.5)
BUN SERPL-MCNC: 43 MG/DL (ref 8–22)
CALCIUM SERPL-MCNC: 9.4 MG/DL (ref 8.5–10.5)
CHLORIDE SERPL-SCNC: 107 MMOL/L (ref 96–112)
CHOLEST SERPL-MCNC: 87 MG/DL (ref 100–199)
CO2 SERPL-SCNC: 24 MMOL/L (ref 20–33)
CREAT SERPL-MCNC: 2.05 MG/DL (ref 0.5–1.4)
FASTING STATUS PATIENT QL REPORTED: NORMAL
GLOBULIN SER CALC-MCNC: 2.6 G/DL (ref 1.9–3.5)
GLUCOSE SERPL-MCNC: 112 MG/DL (ref 65–99)
HDLC SERPL-MCNC: 32 MG/DL
LDLC SERPL CALC-MCNC: 25 MG/DL
POTASSIUM SERPL-SCNC: 4.1 MMOL/L (ref 3.6–5.5)
PROT SERPL-MCNC: 6.6 G/DL (ref 6–8.2)
SODIUM SERPL-SCNC: 140 MMOL/L (ref 135–145)
TRIGL SERPL-MCNC: 150 MG/DL (ref 0–149)

## 2019-07-15 PROCEDURE — 36415 COLL VENOUS BLD VENIPUNCTURE: CPT

## 2019-07-15 PROCEDURE — 80053 COMPREHEN METABOLIC PANEL: CPT

## 2019-07-15 PROCEDURE — 80061 LIPID PANEL: CPT

## 2019-07-15 NOTE — TELEPHONE ENCOUNTER
Was the patient seen in the last year in this department? Yes    Does patient have an active prescription for medications requested? No     Received Request Via: Pharmacy    Pt met protocol?: Yes     Last OV 02/27/19    BP Readings from Last 1 Encounters:   02/27/19 102/62

## 2019-07-16 RX ORDER — CARVEDILOL 3.12 MG/1
TABLET ORAL
Qty: 180 TAB | Refills: 1 | Status: SHIPPED | OUTPATIENT
Start: 2019-07-16 | End: 2019-09-03

## 2019-07-16 NOTE — TELEPHONE ENCOUNTER
Refill X 6 months, sent to pharmacy.Pt. Seen in the last 6 months per protocol.   Lab Results   Component Value Date/Time    SODIUM 140 07/15/2019 08:56 AM    POTASSIUM 4.1 07/15/2019 08:56 AM    CHLORIDE 107 07/15/2019 08:56 AM    CO2 24 07/15/2019 08:56 AM    GLUCOSE 112 (H) 07/15/2019 08:56 AM    BUN 43 (H) 07/15/2019 08:56 AM    CREATININE 2.05 (H) 07/15/2019 08:56 AM

## 2019-07-16 NOTE — TELEPHONE ENCOUNTER
Was the patient seen in the last year in this department? Yes    Does patient have an active prescription for medications requested? No     Received Request Via: Pharmacy      Pt met protocol?: Yes, labs 7/16/19    Cholesterol,Tot 87   100 - 199 mg/dL Final   Triglycerides 150   0 - 149 mg/dL Final   HDL 32   >=40 mg/dL Final   LDL        Ov 2/19

## 2019-07-17 RX ORDER — EZETIMIBE 10 MG/1
TABLET ORAL
Qty: 90 TAB | Refills: 1 | Status: SHIPPED | OUTPATIENT
Start: 2019-07-17

## 2019-07-17 NOTE — TELEPHONE ENCOUNTER
Pt has had OV within the 12 month protocol and lipid panel is current. 6 month supply sent to pharmacy.   Lab Results   Component Value Date/Time    CHOLSTRLTOT 87 (L) 07/15/2019 08:56 AM    LDL 25 07/15/2019 08:56 AM    HDL 32 (A) 07/15/2019 08:56 AM    TRIGLYCERIDE 150 (H) 07/15/2019 08:56 AM       Lab Results   Component Value Date/Time    SODIUM 140 07/15/2019 08:56 AM    POTASSIUM 4.1 07/15/2019 08:56 AM    CHLORIDE 107 07/15/2019 08:56 AM    CO2 24 07/15/2019 08:56 AM    GLUCOSE 112 (H) 07/15/2019 08:56 AM    BUN 43 (H) 07/15/2019 08:56 AM    CREATININE 2.05 (H) 07/15/2019 08:56 AM     Lab Results   Component Value Date/Time    ALKPHOSPHAT 55 07/15/2019 08:56 AM    ASTSGOT 17 07/15/2019 08:56 AM    ALTSGPT 20 07/15/2019 08:56 AM    TBILIRUBIN 0.8 07/15/2019 08:56 AM

## 2019-08-29 ENCOUNTER — TELEPHONE (OUTPATIENT)
Dept: MEDICAL GROUP | Facility: PHYSICIAN GROUP | Age: 84
End: 2019-08-29

## 2019-08-29 NOTE — TELEPHONE ENCOUNTER
ESTABLISHED PATIENT PRE-VISIT PLANNING     Patient was NOT contacted to complete PVP.    1.  Reviewed notes from the last few office visits within the medical group: Yes    2.  If any orders were placed at last visit or intended to be done for this visit (i.e. 6 mos follow-up), do we have Results/Consult Notes?        •  Labs - Labs were not ordered at last office visit.       •  Imaging - Imaging was not ordered at last office visit.       •  Referrals - No referrals were ordered at last office visit.    3. Is this appointment scheduled as a Hospital Follow-Up? No    4.  Immunizations were updated in [facility] using WebIZ?: Yes       •  Web Iz Recommendations: FLU, TDAP, VARICELLA (Chicken Pox)  and SHINGRIX (Shingles)    5.  Patient is due for the following Health Maintenance Topics:   Health Maintenance Due   Topic Date Due   • Annual Wellness Visit  04/06/1930   • IMM HEP B VACCINE (1 of 3 - Risk 3-dose series) 04/06/1949   • IMM DTaP/Tdap/Td Vaccine (1 - Tdap) 04[birth year]06/1949   • IMM ZOSTER VACCINES (1 of 2) 04/06/1980   • IMM INFLUENZA (1) 09/01/2019     6. Orders for overdue Health Maintenance topics pended in Pre-Charting? NO    7.  AHA (MDX) form printed for Provider? No, already completed    8.  Patient was NOT informed to arrive 15 min prior to their scheduled appointment and bring in their medication bottles.

## 2019-09-03 ENCOUNTER — OFFICE VISIT (OUTPATIENT)
Dept: MEDICAL GROUP | Facility: PHYSICIAN GROUP | Age: 84
End: 2019-09-03
Payer: MEDICARE

## 2019-09-03 ENCOUNTER — TELEPHONE (OUTPATIENT)
Dept: MEDICAL GROUP | Facility: PHYSICIAN GROUP | Age: 84
End: 2019-09-03

## 2019-09-03 VITALS
SYSTOLIC BLOOD PRESSURE: 100 MMHG | OXYGEN SATURATION: 96 % | BODY MASS INDEX: 29.46 KG/M2 | WEIGHT: 205.8 LBS | DIASTOLIC BLOOD PRESSURE: 60 MMHG | HEART RATE: 56 BPM | HEIGHT: 70 IN | TEMPERATURE: 98.5 F | RESPIRATION RATE: 22 BRPM

## 2019-09-03 DIAGNOSIS — E78.5 HYPERLIPIDEMIA, UNSPECIFIED HYPERLIPIDEMIA TYPE: Chronic | ICD-10-CM

## 2019-09-03 DIAGNOSIS — N18.30 CKD (CHRONIC KIDNEY DISEASE) STAGE 3, GFR 30-59 ML/MIN (HCC): ICD-10-CM

## 2019-09-03 DIAGNOSIS — K21.9 GASTROESOPHAGEAL REFLUX DISEASE WITHOUT ESOPHAGITIS: ICD-10-CM

## 2019-09-03 DIAGNOSIS — I10 ESSENTIAL HYPERTENSION, BENIGN: Chronic | ICD-10-CM

## 2019-09-03 DIAGNOSIS — C44.319 BASAL CELL CARCINOMA OF BROW: ICD-10-CM

## 2019-09-03 DIAGNOSIS — I25.10 CORONARY ARTERY DISEASE INVOLVING NATIVE HEART, ANGINA PRESENCE UNSPECIFIED, UNSPECIFIED VESSEL OR LESION TYPE: ICD-10-CM

## 2019-09-03 PROCEDURE — G0439 PPPS, SUBSEQ VISIT: HCPCS | Performed by: INTERNAL MEDICINE

## 2019-09-03 RX ORDER — PRAVASTATIN SODIUM 10 MG
10 TABLET ORAL NIGHTLY
COMMUNITY

## 2019-09-03 RX ORDER — FUROSEMIDE 40 MG/1
40 TABLET ORAL
Refills: 3 | COMMUNITY
Start: 2019-06-06 | End: 2019-09-02

## 2019-09-03 ASSESSMENT — PATIENT HEALTH QUESTIONNAIRE - PHQ9
5. POOR APPETITE OR OVEREATING: 0 - NOT AT ALL
SUM OF ALL RESPONSES TO PHQ QUESTIONS 1-9: 5
CLINICAL INTERPRETATION OF PHQ2 SCORE: 1

## 2019-09-03 ASSESSMENT — ACTIVITIES OF DAILY LIVING (ADL): BATHING_REQUIRES_ASSISTANCE: 1

## 2019-09-03 ASSESSMENT — ENCOUNTER SYMPTOMS: GENERAL WELL-BEING: GOOD

## 2019-09-03 NOTE — PROGRESS NOTES
Chief Complaint   Patient presents with   • Annual Wellness Visit         HPI:  Emerson is a 89 y.o. here for Medicare Annual Wellness Visit        Patient Active Problem List    Diagnosis Date Noted   • CAD (coronary artery disease) 06/02/2011     Priority: High   • CKD (chronic kidney disease) stage 3, GFR 30-59 ml/min ([facility]) 06/02/2011     Priority: High   • Basal cell carcinoma of brow 09/02/2014     Priority: Medium     Class: Chronic   • Hyperlipidemia 12/01/2011     Priority: Medium   • Essential hypertension, benign 06/02/2011     Priority: Medium   • Dysphagia 02/27/2019   • Obesity (BMI 30-39.9) 04/23/2018   • Impaired mobility 05/10/2017   • Macrocytic anemia 04/22/2015   • GERD (gastroesophageal reflux disease) 02/25/2015       Current Outpatient Medications   Medication Sig Dispense Refill   • ezetimibe (ZETIA) 10 MG Tab TAKE 1 TABLET BY MOUTH ONCE DAILY 90 Tab 1   • clopidogrel (PLAVIX) 75 MG Tab TAKE ONE TABLET BY MOUTH ONCE DAILY 90 Tab 0   • furosemide (LASIX) 20 MG Tab Take 20 mg by mouth 2 times a day.     • losartan (COZAAR) 100 MG Tab TAKE 1 TABLET BY MOUTH ONCE DAILY 90 Tab 1   • amLODIPine (NORVASC) 2.5 MG Tab TAKE 1 TABLET BY MOUTH ONCE DAILY 90 Tab 1   • Non Formulary Request Systane eye gtts, besivance eye gtts, tradnisolone eye gtts, ketorolac eye gtts two to three times a day     • nitroglycerin (NITROSTAT) 0.4 MG SUBL Place 0.4 mg under tongue every 5 minutes as needed.     • omeprazole (PRILOSEC) 20 MG CPDR One 1/2 hr before dinner 30 Cap 0   • Multiple Vitamins-Minerals (CENTRUM SILVER PO) Take  by mouth every day.     • docosahexanoic acid (FISH OIL) 1000 MG CAPS Take 1,000 mg by mouth 2 Times a Day.     • Homeopathic Products (SIMILASAN ALLERGY EYE RELIEF OP) by Ophthalmic route 3 times a day.     • carvedilol (COREG) 3.125 MG Tab TAKE 1 TABLET BY MOUTH TWICE DAILY WITH MEALS (Patient not taking: Reported on 9/3/2019) 180 Tab 1   • atorvastatin (LIPITOR) 40 MG Tab Take 1 Tab by mouth  every day. (Patient not taking: Reported on 9/3/2019) 100 Tab 3     No current facility-administered medications for this visit.         Patient is taking medications as noted in medication list.  Current supplements as per medication list.     Allergies: Nkda [no known drug allergy]    Current social contact/activities: Visiting with friends and family    Is patient current with immunizations? No, due for TDAP. Patient is interested in receiving NONE today.    He  reports that he quit smoking about 46 years ago. His smoking use included cigarettes. He has a 10.00 pack-year smoking history. He has never used smokeless tobacco. He reports that he does not drink alcohol or use drugs.  Counseling given: Not Answered        DPA/Advanced directive: Patient does not have an Advanced Directive.  A packet and workshop information was given on Advanced Directives.    ROS:    Gait: Uses a cane, walker  Ostomy: No   Other tubes: No   Amputations: No   Chronic oxygen use No   Last eye exam 6 mo ago  Wears hearing aids: No   : Reports urinary leakage during the last 6 months that has not interfered at all with their daily activities or sleep.  Annual Health Assessment Questions:    1.  Are you currently engaging in any exercise or physical activity? No    2.  How would you describe your mood or emotional well-being today? good    3.  Have you had any falls in the last year? No    4.  Have you noticed any problems with your balance or had difficulty walking? Yes    5.  In the last six months have you experienced any leakage of urine? No    6. DPA/Advanced Directive: Patient does not have an Advanced Directive.  A packet and workshop information was given on Advanced Directives.    Screening:        Depression Screening    Little interest or pleasure in doing things?  0 - not at all  Feeling down, depressed, or hopeless? 1 - several days  Trouble falling or staying asleep, or sleeping too much?  3 - nearly every day  Feeling  tired or having little energy?  0 - not at all  Poor appetite or overeating?  0 - not at all  Feeling bad about yourself - or that you are a failure or have let yourself or your family down? 1 - several days  Trouble concentrating on things, such as reading the newspaper or watching television? 0 - not at all  Moving or speaking so slowly that other people could have noticed.  Or the opposite - being so fidgety or restless that you have been moving around a lot more than usual?  0 - not at all  Thoughts that you would be better off dead, or of hurting yourself?  0 - not at all  Patient Health Questionnaire Score: 5      If depressive symptoms identified deferred to follow up visit unless specifically addressed in assessment and plan.    Interpretation of PHQ-9 Total Score   Score Severity   1-4 No Depression   5-9 Mild Depression   10-14 Moderate Depression   15-19 Moderately Severe Depression   20-27 Severe Depression      Screening for Cognitive Impairment    Three Minute Recall (village, kitchen, baby)  3/3 Village, Kitchen, Baby  Draw clock face with all 12 numbers and set the hands to show 10 past 10.  Yes 10:10 5/5  If cognitive concerns identified, deferred for follow up unless specifically addressed in assessment and plan.    Fall Risk Assessment    Has the patient had two or more falls in the last year or any fall with injury in the last year?  No  If fall risk identified, deferred for follow up unless specifically addressed in assessment and plan.      Safety Assessment    Throw rugs on floor.  Yes  Handrails on all stairs.  Yes  Good lighting in all hallways.  Yes  Difficulty hearing.  No  Patient counseled about all safety risks that were identified.    Functional Assessment ADLs    Are there any barriers preventing you from cooking for yourself or meeting nutritional needs?  No.    Are there any barriers preventing you from driving safely or obtaining transportation?  No.    Are there any barriers  preventing you from using a telephone or calling for help?  No.    Are there any barriers preventing you from shopping?  No.    Are there any barriers preventing you from taking care of your own finances?  Yes. Wife does it all    Are there any barriers preventing you from managing your medications?    Yes.    Are there any barriers preventing you from showering, bathing or dressing yourself?  Yes.    Are you currently engaging in any exercise or physical activity?  No.     What is your perception of your health?  Good.    Health Maintenance Summary                Annual Wellness Visit Overdue 1930     IMM HEP B VACCINE Overdue 1949     IMM DTaP/Tdap/Td Vaccine Overdue 1949     IMM INFLUENZA Postponed 2019 Originally 2019. System: vaccine not available, other system reasons     Done 2017 Imm Admin: Influenza Vaccine Adult HD     Patient has more history with this topic...    IMM ZOSTER VACCINES Postponed 2/3/2020 Originally 1980. System: vaccine not available, other system reasons    COLONOSCOPY Next Due 2020      Not specified 2010           Patient Care Team:  Sean Jurado M.D. as PCP - General (Internal Medicine)  Fadi Najjar, M.D. as Consulting Physician (Nephrology)  Fei Zapata M.D. as Consulting Physician (Ophthalmology)  Rao Mendes M.D. as Consulting Physician (Cardiology)  Aliya Mcdowell M.D. as Consulting Physician (Dermatology)  Rao Mendes M.D. (Cardiology)  Deshawn Singh M.D. (Geriatrics)  Nata Gonzalez [facility] as        Social History     Tobacco Use   • Smoking status: Former Smoker     Packs/day: 0.50     Years: 20.00     Pack years: 10.00     Types: Cigarettes     Last attempt to quit: 10/25/1972     Years since quittin.8   • Smokeless tobacco: Never Used   Substance Use Topics   • Alcohol use: No     Alcohol/week: 0.0 oz     Comment: quit 2015   • Drug use: No     Family History   Problem Relation  "Age of Onset   • Other Mother         Obesity, Blood clot   • Stroke Father      He  has a past medical history of Arrhythmia, Arthritis, Basal cell carcinoma of brow (9/2/2014), BMI 31.0-31.9,adult (2/25/2015), CAD (coronary artery disease), Cataract, GERD (gastroesophageal reflux disease) (2/25/2015), Hyperlipidemia (12/1/2011), Hypertension, and Stented coronary artery.   Past Surgical History:   Procedure Laterality Date   • ESOPHAGOSCOPY N/A 2/21/2018    Procedure: ESOPHAGOSCOPY- BIOPSY AS NEEDED;  Surgeon: Allan Latham M.D.;  Location: SURGERY SAME DAY [facility] ORS;  Service: Ent   • LESION EXCISION GENERAL  2/21/2018    Procedure: LESION EXCISION GENERAL- TONGUE ;  Surgeon: Allan Latham M.D.;  Location: SURGERY SAME DAY [facility] ORS;  Service: Ent   • LARYNGOSCOPY N/A 2/21/2018    Procedure: LARYNGOSCOPY- DIRECT;  Surgeon: Allan Latham M.D.;  Location: SURGERY SAME DAY [facility] ORS;  Service: Ent   • CATARACT PHACO WITH IOL  9/23/2014    Performed by Fei Zapata M.D. at [facility] ORS   • CATARACT PHACO WITH IOL  8/26/2014    Performed by Fei Zapata M.D. at [facility] ORS   • ANGIOPLASTY BALLOON     • APPENDECTOMY     • EYE SURGERY           Exam:     /60 (BP Location: Right arm, Patient Position: Sitting, BP Cuff Size: Adult)   Pulse (!) 56   Temp 36.9 °C (98.5 °F) (Temporal)   Resp (!) 22   Ht 1.778 m (5' 10\")   Wt 93.4 kg (205 lb 12.8 oz)   SpO2 96%  Body mass index is 29.53 kg/m².    Hearing good.    Dentition good  Alert, oriented in no acute distress.  Eye contact is good, speech goal directed, affect calm      Assessment and Plan. The following treatment and monitoring plan is recommended:    Essential hypertension, benign  Well controlled on amlodipine, lasix, and losartan.     CKD (chronic kidney disease) stage 3, GFR 30-59 ml/min  Chronic, stable.     CAD (coronary artery disease)  Chronic, stable. Following with his cardiologist Dr. Mendes. "     Basal cell carcinoma of brow  Following with dermatologist, Dr. Mcdowell.     GERD (gastroesophageal reflux disease)  Takes omeprazole as needed.     Hyperlipidemia  Taking pravastatin 10 mg and zetia 10 mg daily.       Services suggested: No services needed at this time  Health Care Screening recommendations as per orders if indicated.  Referrals offered: PT/OT/Nutrition counseling/Behavioral Health/Smoking cessation as per orders if indicated.    Discussion today about general wellness and lifestyle habits:    · Prevent falls and reduce trip hazards; Cautioned about securing or removing rugs.  · Have a working fire alarm and carbon monoxide detector;   · Engage in regular physical activity and social activities       Follow-up: No follow-ups on file.

## 2019-09-03 NOTE — TELEPHONE ENCOUNTER
.1. Caller Name: Lamont                                           Call Back Number: [phone] (home)         Patient approves a detailed voicemail message: N\A    LVM for pt on his phone to call about moving his appointment to this afternoon. I was able to get ahold of his wife Rajwinder and they agreed to 1:15pm today.

## 2019-09-13 DIAGNOSIS — I25.10 CORONARY ARTERY DISEASE WITHOUT ANGINA PECTORIS, UNSPECIFIED VESSEL OR LESION TYPE, UNSPECIFIED WHETHER NATIVE OR TRANSPLANTED HEART: ICD-10-CM

## 2019-09-13 RX ORDER — CLOPIDOGREL BISULFATE 75 MG/1
TABLET ORAL
Qty: 90 TAB | Refills: 1 | Status: SHIPPED | OUTPATIENT
Start: 2019-09-13 | End: 2020-03-10

## 2019-09-13 NOTE — TELEPHONE ENCOUNTER
Was the patient seen in the last year in this department? Yes    Does patient have an active prescription for medications requested? No     Received Request Via: Pharmacy      Pt met protocol?: Yes    LAST OV 09/03/2019

## 2019-12-18 NOTE — PROGRESS NOTES
Pt's wife Rajwinder called and let me know they will no longer be with [facility]. They have enrolled into [facility] due to gym, acupuncture, and dental benefits. She understands that they will no longer be part of the  program.

## 2020-01-24 ENCOUNTER — HOSPITAL ENCOUNTER (OUTPATIENT)
Dept: HOSPITAL 8 - CVU | Age: 85
Discharge: HOME | End: 2020-01-24
Attending: INTERNAL MEDICINE
Payer: MEDICAID

## 2020-01-24 DIAGNOSIS — I25.10: ICD-10-CM

## 2020-01-24 DIAGNOSIS — I11.0: ICD-10-CM

## 2020-01-24 DIAGNOSIS — I50.9: ICD-10-CM

## 2020-01-24 DIAGNOSIS — I25.2: ICD-10-CM

## 2020-01-24 DIAGNOSIS — E78.5: ICD-10-CM

## 2020-01-24 DIAGNOSIS — I08.8: Primary | ICD-10-CM

## 2020-01-24 PROCEDURE — 93306 TTE W/DOPPLER COMPLETE: CPT

## 2020-02-07 NOTE — TELEPHONE ENCOUNTER
Chart reviewed.   Requested updates from Care Everywhere.  Immunizations reconciled.   HM updated.    [facility] faxing last colonoscopy done 2010   Refill X 6 months, sent to pharmacy.Pt. Seen in the last 6 months per protocol.   Lab Results   Component Value Date/Time    SODIUM 138 10/20/2016 09:54 AM    SODIUM 140 10/20/2016 09:54 AM    POTASSIUM 4.8 10/20/2016 09:54 AM    POTASSIUM 4.8 10/20/2016 09:54 AM    CHLORIDE 108 10/20/2016 09:54 AM    CHLORIDE 107 10/20/2016 09:54 AM    CO2 25 10/20/2016 09:54 AM    CO2 26 10/20/2016 09:54 AM    GLUCOSE 118* 10/20/2016 09:54 AM    GLUCOSE 119* 10/20/2016 09:54 AM    BUN 22 10/20/2016 09:54 AM    BUN 23* 10/20/2016 09:54 AM    CREATININE 1.77* 10/20/2016 09:54 AM    CREATININE 1.75* 10/20/2016 09:54 AM

## 2020-03-09 DIAGNOSIS — I25.10 CORONARY ARTERY DISEASE WITHOUT ANGINA PECTORIS, UNSPECIFIED VESSEL OR LESION TYPE, UNSPECIFIED WHETHER NATIVE OR TRANSPLANTED HEART: ICD-10-CM

## 2020-03-10 RX ORDER — CLOPIDOGREL BISULFATE 75 MG/1
TABLET ORAL
Qty: 90 TAB | Refills: 0 | Status: SHIPPED | OUTPATIENT
Start: 2020-03-10 | End: 2020-06-10

## 2020-03-10 NOTE — TELEPHONE ENCOUNTER
Was the patient seen in the last year in this department? Yes    Does patient have an active prescription for medications requested? No     Received Request Via: Pharmacy      Pt met protocol?: Yes    OV 9/19     *PT NEEDS TO MAKE APPT WITH NEW PCP

## 2020-06-08 DIAGNOSIS — I25.10 CORONARY ARTERY DISEASE WITHOUT ANGINA PECTORIS, UNSPECIFIED VESSEL OR LESION TYPE, UNSPECIFIED WHETHER NATIVE OR TRANSPLANTED HEART: ICD-10-CM

## 2020-06-10 RX ORDER — CLOPIDOGREL BISULFATE 75 MG/1
TABLET ORAL
Qty: 30 TAB | Refills: 0 | Status: SHIPPED | OUTPATIENT
Start: 2020-06-10

## 2020-06-11 NOTE — TELEPHONE ENCOUNTER
Use to see Dr. Jurado. Need to establish with new pcp.Last seen by PCP 09/03/2019. Already given a courtesy 3 month fill. Will send 113 month(s) to the pharmacy.  Pt to make appt prior to more refills.

## 2020-06-24 ENCOUNTER — APPOINTMENT (RX ONLY)
Dept: URBAN - METROPOLITAN AREA CLINIC 22 | Facility: CLINIC | Age: 85
Setting detail: DERMATOLOGY
End: 2020-06-24

## 2020-06-24 DIAGNOSIS — L57.0 ACTINIC KERATOSIS: ICD-10-CM

## 2020-06-24 DIAGNOSIS — L81.4 OTHER MELANIN HYPERPIGMENTATION: ICD-10-CM

## 2020-06-24 DIAGNOSIS — Z85.828 PERSONAL HISTORY OF OTHER MALIGNANT NEOPLASM OF SKIN: ICD-10-CM

## 2020-06-24 DIAGNOSIS — D18.0 HEMANGIOMA: ICD-10-CM

## 2020-06-24 DIAGNOSIS — L82.1 OTHER SEBORRHEIC KERATOSIS: ICD-10-CM

## 2020-06-24 DIAGNOSIS — Z71.89 OTHER SPECIFIED COUNSELING: ICD-10-CM

## 2020-06-24 DIAGNOSIS — D22 MELANOCYTIC NEVI: ICD-10-CM

## 2020-06-24 DIAGNOSIS — L82.0 INFLAMED SEBORRHEIC KERATOSIS: ICD-10-CM

## 2020-06-24 PROBLEM — D18.01 HEMANGIOMA OF SKIN AND SUBCUTANEOUS TISSUE: Status: ACTIVE | Noted: 2020-06-24

## 2020-06-24 PROBLEM — D22.5 MELANOCYTIC NEVI OF TRUNK: Status: ACTIVE | Noted: 2020-06-24

## 2020-06-24 PROBLEM — D48.5 NEOPLASM OF UNCERTAIN BEHAVIOR OF SKIN: Status: ACTIVE | Noted: 2020-06-24

## 2020-06-24 PROCEDURE — 11102 TANGNTL BX SKIN SINGLE LES: CPT | Mod: 59

## 2020-06-24 PROCEDURE — ? LIQUID NITROGEN

## 2020-06-24 PROCEDURE — 11103 TANGNTL BX SKIN EA SEP/ADDL: CPT | Mod: 59

## 2020-06-24 PROCEDURE — ? BIOPSY BY SHAVE METHOD

## 2020-06-24 PROCEDURE — ? COUNSELING

## 2020-06-24 PROCEDURE — 17110 DESTRUCTION B9 LES UP TO 14: CPT

## 2020-06-24 PROCEDURE — 99203 OFFICE O/P NEW LOW 30 MIN: CPT | Mod: 25

## 2020-06-24 ASSESSMENT — LOCATION ZONE DERM
LOCATION ZONE: EAR
LOCATION ZONE: FACE
LOCATION ZONE: ARM
LOCATION ZONE: TRUNK

## 2020-06-24 ASSESSMENT — LOCATION DETAILED DESCRIPTION DERM
LOCATION DETAILED: LEFT MEDIAL FOREHEAD
LOCATION DETAILED: RIGHT PROXIMAL DORSAL FOREARM
LOCATION DETAILED: RIGHT SUPERIOR FOREHEAD
LOCATION DETAILED: RIGHT MEDIAL INFERIOR CHEST
LOCATION DETAILED: LEFT INFERIOR HELIX
LOCATION DETAILED: LEFT INFERIOR UPPER BACK
LOCATION DETAILED: LEFT PROXIMAL DORSAL FOREARM
LOCATION DETAILED: RIGHT SUPERIOR LATERAL MIDBACK

## 2020-06-24 ASSESSMENT — LOCATION SIMPLE DESCRIPTION DERM
LOCATION SIMPLE: RIGHT LOWER BACK
LOCATION SIMPLE: RIGHT FOREHEAD
LOCATION SIMPLE: LEFT UPPER BACK
LOCATION SIMPLE: LEFT FOREHEAD
LOCATION SIMPLE: RIGHT FOREARM
LOCATION SIMPLE: LEFT FOREARM
LOCATION SIMPLE: LEFT EAR
LOCATION SIMPLE: CHEST

## 2020-06-24 NOTE — PROCEDURE: BIOPSY BY SHAVE METHOD
Detail Level: Detailed
Depth Of Biopsy: dermis
Was A Bandage Applied: Yes
Size Of Lesion In Cm: 0
Biopsy Type: H and E
Biopsy Method: Dermablade
Anesthesia Type: 1% lidocaine with epinephrine
Anesthesia Volume In Cc: 0.5
Hemostasis: Electrocautery
Wound Care: Petrolatum
Dressing: bandage
Destruction After The Procedure: No
Type Of Destruction Used: Curettage
Curettage Text: The wound bed was treated with curettage after the biopsy was performed.
Cryotherapy Text: The wound bed was treated with cryotherapy after the biopsy was performed.
Electrodesiccation Text: The wound bed was treated with electrodesiccation after the biopsy was performed.
Electrodesiccation And Curettage Text: The wound bed was treated with electrodesiccation and curettage after the biopsy was performed.
Silver Nitrate Text: The wound bed was treated with silver nitrate after the biopsy was performed.
Lab: [id number]
Lab Facility: 
Consent: Written consent was obtained and risks were reviewed including but not limited to scarring, infection, bleeding, scabbing, incomplete removal, nerve damage and allergy to anesthesia.
Post-Care Instructions: I reviewed with the patient in detail post-care instructions. Patient is to keep the biopsy site dry overnight, and then apply bacitracin twice daily until healed. Patient may apply hydrogen peroxide soaks to remove any crusting.
Notification Instructions: Patient will be notified of biopsy results. However, patient instructed to call the office if not contacted within 2 weeks.
Billing Type: Third-Party Bill
Information: Selecting Yes will display possible errors in your note based on the variables you have selected. This validation is only offered as a suggestion for you. PLEASE NOTE THAT THE VALIDATION TEXT WILL BE REMOVED WHEN YOU FINALIZE YOUR NOTE. IF YOU WANT TO FAX A PRELIMINARY NOTE YOU WILL NEED TO TOGGLE THIS TO 'NO' IF YOU DO NOT WANT IT IN YOUR FAXED NOTE.
Hemostasis: Drysol

## 2020-09-21 NOTE — TELEPHONE ENCOUNTER
Referral signed    Double O-Z Plasty Text: The defect edges were debeveled with a #15 scalpel blade.  Given the location of the defect, shape of the defect and the proximity to free margins a Double O-Z plasty (double transposition flap) was deemed most appropriate.  Using a sterile surgical marker, the appropriate transposition flaps were drawn incorporating the defect and placing the expected incisions within the relaxed skin tension lines where possible. The area thus outlined was incised deep to adipose tissue with a #15 scalpel blade.  The skin margins were undermined to an appropriate distance in all directions utilizing iris scissors.  Hemostasis was achieved with electrocautery.  The flaps were then transposed into place, one clockwise and the other counterclockwise, and anchored with interrupted buried subcutaneous sutures.

## 2020-09-23 ENCOUNTER — APPOINTMENT (RX ONLY)
Dept: URBAN - METROPOLITAN AREA CLINIC 22 | Facility: CLINIC | Age: 85
Setting detail: DERMATOLOGY
End: 2020-09-23

## 2020-09-23 DIAGNOSIS — L82.1 OTHER SEBORRHEIC KERATOSIS: ICD-10-CM

## 2020-09-23 DIAGNOSIS — D18.0 HEMANGIOMA: ICD-10-CM

## 2020-09-23 DIAGNOSIS — D22 MELANOCYTIC NEVI: ICD-10-CM

## 2020-09-23 DIAGNOSIS — L81.4 OTHER MELANIN HYPERPIGMENTATION: ICD-10-CM

## 2020-09-23 PROBLEM — C44.519 BASAL CELL CARCINOMA OF SKIN OF OTHER PART OF TRUNK: Status: ACTIVE | Noted: 2020-09-23

## 2020-09-23 PROBLEM — D18.01 HEMANGIOMA OF SKIN AND SUBCUTANEOUS TISSUE: Status: ACTIVE | Noted: 2020-09-23

## 2020-09-23 PROBLEM — D22.9 MELANOCYTIC NEVI, UNSPECIFIED: Status: ACTIVE | Noted: 2020-09-23

## 2020-09-23 PROBLEM — D48.5 NEOPLASM OF UNCERTAIN BEHAVIOR OF SKIN: Status: ACTIVE | Noted: 2020-09-23

## 2020-09-23 PROBLEM — C44.629 SQUAMOUS CELL CARCINOMA OF SKIN OF LEFT UPPER LIMB, INCLUDING SHOULDER: Status: ACTIVE | Noted: 2020-09-23

## 2020-09-23 PROCEDURE — 17261 DSTRJ MAL LES T/A/L .6-1.0CM: CPT

## 2020-09-23 PROCEDURE — ? DIAGNOSIS COMMENT

## 2020-09-23 PROCEDURE — ? DEFER

## 2020-09-23 PROCEDURE — 13121 CMPLX RPR S/A/L 2.6-7.5 CM: CPT | Mod: 59

## 2020-09-23 PROCEDURE — ? CURETTAGE AND DESTRUCTION

## 2020-09-23 PROCEDURE — ? EXCISION

## 2020-09-23 PROCEDURE — ? COUNSELING

## 2020-09-23 PROCEDURE — 11603 EXC TR-EXT MAL+MARG 2.1-3 CM: CPT | Mod: 59

## 2020-09-23 NOTE — PROCEDURE: DEFER
Detail Level: Detailed
Introduction Text (Please End With A Colon): Patient would like to defer the following tx: punch biopsy

## 2020-09-23 NOTE — PROCEDURE: EXCISION
Referring Physician (Optional): STEVEN Lai
Surgeon (Optional): Isaiah Vaughn MD
Biopsy Photograph Reviewed: Yes
Previous Accession (Optional): [id number].
Date Of Previous Biopsy (Optional): 6/24/20
Size Of Lesion In Cm: 1.8
X Size Of Lesion In Cm (Optional): 1
Size Of Margin In Cm: 0.4
Excision Method: Excisional Biopsy
Anesthesia Volume In Cc: 12
Did You Provide Opioid Counseling: No
Repair Type: Complex
Suturegard Retention Suture: 2-0 Nylon
Retention Suture Bite Size: 3 mm
Length To Time In Minutes Device Was In Place: 10
Intermediate / Complex Repair - Final Wound Length In Cm: 7
Distance Of Undermining In Cm (Required): 2
Undermining Type: Entire Wound
Debridement Text: The wound edges were debrided prior to proceeding with the closure to facilitate wound healing.
Helical Rim Text: The closure involved the helical rim.
Vermilion Border Text: The closure involved the vermilion border.
Nostril Rim Text: The closure involved the nostril rim.
Retention Suture Text: Retention sutures were placed to support the closure and prevent dehiscence.
Primary Defect Length (In Cm): 0
Suture Removal: 14 days
Lab: [id number]
Lab Facility: 
Graft Donor Site Bandage (Optional-Leave Blank If You Don't Want In Note): Steri-strips and a pressure bandage were applied to the donor site.
Epidermal Closure Graft Donor Site (Optional): simple interrupted
Billing Type: Third-Party Bill
Excision Depth: adipose tissue
Scalpel Size: 15 blade
Anesthesia Type: 1% lidocaine with epinephrine and a 1:10 solution of 8.4% sodium bicarbonate
Additional Anesthesia Volume In Cc: 6
Hemostasis: Electricator
Estimated Blood Loss (Cc): minimal
Detail Level: Detailed
Anesthesia Volume In Cc: 1.5
Deep Sutures: 4-0 Maxon
Dermal Closure: buried vertical mattress
Epidermal Sutures: 5-0 Surgipro
Epidermal Closure: running
Wound Care: Petrolatum
Dressing: pressure dressing with telfa
Suturegard Intro: Intraoperative tissue expansion was performed, utilizing the SUTUREGARD device, in order to reduce wound tension.
Suturegard Body: The suture ends were repeatedly re-tightened and re-clamped to achieve the desired tissue expansion.
Hemigard Intro: Due to skin fragility and wound tension, it was decided to use HEMIGARD adhesive retention suture devices to permit a linear closure. The skin was cleaned and dried for a 6cm distance away from the wound. Excessive hair, if present, was removed to allow for adhesion.
Hemigard Postcare Instructions: The HEMIGARD strips are to remain completely dry for at least 5-7 days.
Positioning (Leave Blank If You Do Not Want): The patient was placed in a comfortable position exposing the surgical site.
Pre-Excision Curettage Text (Leave Blank If You Do Not Want): Prior to drawing the surgical margin the visible lesion was removed with electrodesiccation and curettage to clearly define the lesion size.
Complex Repair Preamble Text (Leave Blank If You Do Not Want): Extensive wide undermining was performed.
Intermediate Repair Preamble Text (Leave Blank If You Do Not Want): Undermining was performed with blunt dissection.
Curvilinear Excision Additional Text (Leave Blank If You Do Not Want): The margin was drawn around the clinically apparent lesion.  A curvilinear shape was then drawn on the skin incorporating the lesion and margins.  Incisions were then made along these lines to the appropriate tissue plane and the lesion was extirpated.
Fusiform Excision Additional Text (Leave Blank If You Do Not Want): The margin was drawn around the clinically apparent lesion.  A fusiform shape was then drawn on the skin incorporating the lesion and margins.  Incisions were then made along these lines to the appropriate tissue plane and the lesion was extirpated.
Elliptical Excision Additional Text (Leave Blank If You Do Not Want): The margin was drawn around the clinically apparent lesion.  An elliptical shape was then drawn on the skin incorporating the lesion and margins.  Incisions were then made along these lines to the appropriate tissue plane and the lesion was extirpated.
Saucerization Excision Additional Text (Leave Blank If You Do Not Want): The margin was drawn around the clinically apparent lesion.  Incisions were then made along these lines, in a tangential fashion, to the appropriate tissue plane and the lesion was extirpated.
Slit Excision Additional Text (Leave Blank If You Do Not Want): A linear line was drawn on the skin overlying the lesion. An incision was made slowly until the lesion was visualized.  Once visualized, the lesion was removed with blunt dissection.
Excisional Biopsy Additional Text (Leave Blank If You Do Not Want): The margin was drawn around the clinically apparent lesion. An elliptical shape was then drawn on the skin incorporating the lesion and margins.  Incisions were then made along these lines to the appropriate tissue plane and the lesion was extirpated.
Perilesional Excision Additional Text (Leave Blank If You Do Not Want): The margin was drawn around the clinically apparent lesion. Incisions were then made along these lines to the appropriate tissue plane and the lesion was extirpated.
Repair Performed By Another Provider Text (Leave Blank If You Do Not Want): After the tissue was excised the defect was repaired by another provider.
No Repair - Repaired With Adjacent Surgical Defect Text (Leave Blank If You Do Not Want): After the excision the defect was repaired concurrently with another surgical defect which was in close approximation.
Advancement Flap (Single) Text: The defect edges were debeveled with a #15 scalpel blade.  Given the location of the defect and the proximity to free margins a single advancement flap was deemed most appropriate.  Using a sterile surgical marker, an appropriate advancement flap was drawn incorporating the defect and placing the expected incisions within the relaxed skin tension lines where possible.    The area thus outlined was incised deep to adipose tissue with a #15 scalpel blade.  The skin margins were undermined to an appropriate distance in all directions utilizing iris scissors.
Advancement Flap (Double) Text: The defect edges were debeveled with a #15 scalpel blade.  Given the location of the defect and the proximity to free margins a double advancement flap was deemed most appropriate.  Using a sterile surgical marker, the appropriate advancement flaps were drawn incorporating the defect and placing the expected incisions within the relaxed skin tension lines where possible.    The area thus outlined was incised deep to adipose tissue with a #15 scalpel blade.  The skin margins were undermined to an appropriate distance in all directions utilizing iris scissors.
Burow's Advancement Flap Text: The defect edges were debeveled with a #15 scalpel blade.  Given the location of the defect and the proximity to free margins a Burow's advancement flap was deemed most appropriate.  Using a sterile surgical marker, the appropriate advancement flap was drawn incorporating the defect and placing the expected incisions within the relaxed skin tension lines where possible.    The area thus outlined was incised deep to adipose tissue with a #15 scalpel blade.  The skin margins were undermined to an appropriate distance in all directions utilizing iris scissors.
Chonodrocutaneous Helical Advancement Flap Text: The defect edges were debeveled with a #15 scalpel blade.  Given the location of the defect and the proximity to free margins a chondrocutaneous helical advancement flap was deemed most appropriate.  Using a sterile surgical marker, the appropriate advancement flap was drawn incorporating the defect and placing the expected incisions within the relaxed skin tension lines where possible.    The area thus outlined was incised deep to adipose tissue with a #15 scalpel blade.  The skin margins were undermined to an appropriate distance in all directions utilizing iris scissors.
Crescentic Advancement Flap Text: The defect edges were debeveled with a #15 scalpel blade.  Given the location of the defect and the proximity to free margins a crescentic advancement flap was deemed most appropriate.  Using a sterile surgical marker, the appropriate advancement flap was drawn incorporating the defect and placing the expected incisions within the relaxed skin tension lines where possible.    The area thus outlined was incised deep to adipose tissue with a #15 scalpel blade.  The skin margins were undermined to an appropriate distance in all directions utilizing iris scissors.
A-T Advancement Flap Text: The defect edges were debeveled with a #15 scalpel blade.  Given the location of the defect, shape of the defect and the proximity to free margins an A-T advancement flap was deemed most appropriate.  Using a sterile surgical marker, an appropriate advancement flap was drawn incorporating the defect and placing the expected incisions within the relaxed skin tension lines where possible.    The area thus outlined was incised deep to adipose tissue with a #15 scalpel blade.  The skin margins were undermined to an appropriate distance in all directions utilizing iris scissors.
O-T Advancement Flap Text: The defect edges were debeveled with a #15 scalpel blade.  Given the location of the defect, shape of the defect and the proximity to free margins an O-T advancement flap was deemed most appropriate.  Using a sterile surgical marker, an appropriate advancement flap was drawn incorporating the defect and placing the expected incisions within the relaxed skin tension lines where possible.    The area thus outlined was incised deep to adipose tissue with a #15 scalpel blade.  The skin margins were undermined to an appropriate distance in all directions utilizing iris scissors.
O-L Flap Text: The defect edges were debeveled with a #15 scalpel blade.  Given the location of the defect, shape of the defect and the proximity to free margins an O-L flap was deemed most appropriate.  Using a sterile surgical marker, an appropriate advancement flap was drawn incorporating the defect and placing the expected incisions within the relaxed skin tension lines where possible.    The area thus outlined was incised deep to adipose tissue with a #15 scalpel blade.  The skin margins were undermined to an appropriate distance in all directions utilizing iris scissors.
O-Z Flap Text: The defect edges were debeveled with a #15 scalpel blade.  Given the location of the defect, shape of the defect and the proximity to free margins an O-Z flap was deemed most appropriate.  Using a sterile surgical marker, an appropriate transposition flap was drawn incorporating the defect and placing the expected incisions within the relaxed skin tension lines where possible. The area thus outlined was incised deep to adipose tissue with a #15 scalpel blade.  The skin margins were undermined to an appropriate distance in all directions utilizing iris scissors.
Double O-Z Flap Text: The defect edges were debeveled with a #15 scalpel blade.  Given the location of the defect, shape of the defect and the proximity to free margins a Double O-Z flap was deemed most appropriate.  Using a sterile surgical marker, an appropriate transposition flap was drawn incorporating the defect and placing the expected incisions within the relaxed skin tension lines where possible. The area thus outlined was incised deep to adipose tissue with a #15 scalpel blade.  The skin margins were undermined to an appropriate distance in all directions utilizing iris scissors.
V-Y Flap Text: The defect edges were debeveled with a #15 scalpel blade.  Given the location of the defect, shape of the defect and the proximity to free margins a V-Y flap was deemed most appropriate.  Using a sterile surgical marker, an appropriate advancement flap was drawn incorporating the defect and placing the expected incisions within the relaxed skin tension lines where possible.    The area thus outlined was incised deep to adipose tissue with a #15 scalpel blade.  The skin margins were undermined to an appropriate distance in all directions utilizing iris scissors.
Advancement-Rotation Flap Text: The defect edges were debeveled with a #15 scalpel blade.  Given the location of the defect, shape of the defect and the proximity to free margins an advancement-rotation flap was deemed most appropriate.  Using a sterile surgical marker, an appropriate flap was drawn incorporating the defect and placing the expected incisions within the relaxed skin tension lines where possible. The area thus outlined was incised deep to adipose tissue with a #15 scalpel blade.  The skin margins were undermined to an appropriate distance in all directions utilizing iris scissors.
Mercedes Flap Text: The defect edges were debeveled with a #15 scalpel blade.  Given the location of the defect, shape of the defect and the proximity to free margins a Mercedes flap was deemed most appropriate.  Using a sterile surgical marker, an appropriate advancement flap was drawn incorporating the defect and placing the expected incisions within the relaxed skin tension lines where possible. The area thus outlined was incised deep to adipose tissue with a #15 scalpel blade.  The skin margins were undermined to an appropriate distance in all directions utilizing iris scissors.
Modified Advancement Flap Text: The defect edges were debeveled with a #15 scalpel blade.  Given the location of the defect, shape of the defect and the proximity to free margins a modified advancement flap was deemed most appropriate.  Using a sterile surgical marker, an appropriate advancement flap was drawn incorporating the defect and placing the expected incisions within the relaxed skin tension lines where possible.    The area thus outlined was incised deep to adipose tissue with a #15 scalpel blade.  The skin margins were undermined to an appropriate distance in all directions utilizing iris scissors.
Mucosal Advancement Flap Text: Given the location of the defect, shape of the defect and the proximity to free margins a mucosal advancement flap was deemed most appropriate. Incisions were made with a 15 blade scalpel in the appropriate fashion along the cutaneous vermilion border and the mucosal lip. The remaining actinically damaged mucosal tissue was excised.  The mucosal advancement flap was then elevated to the gingival sulcus with care taken to preserve the neurovascular structures and advanced into the primary defect. Care was taken to ensure that precise realignment of the vermilion border was achieved.
Peng Advancement Flap Text: The defect edges were debeveled with a #15 scalpel blade.  Given the location of the defect, shape of the defect and the proximity to free margins a Peng advancement flap was deemed most appropriate.  Using a sterile surgical marker, an appropriate advancement flap was drawn incorporating the defect and placing the expected incisions within the relaxed skin tension lines where possible. The area thus outlined was incised deep to adipose tissue with a #15 scalpel blade.  The skin margins were undermined to an appropriate distance in all directions utilizing iris scissors.
Hatchet Flap Text: The defect edges were debeveled with a #15 scalpel blade.  Given the location of the defect, shape of the defect and the proximity to free margins a hatchet flap was deemed most appropriate.  Using a sterile surgical marker, an appropriate hatchet flap was drawn incorporating the defect and placing the expected incisions within the relaxed skin tension lines where possible.    The area thus outlined was incised deep to adipose tissue with a #15 scalpel blade.  The skin margins were undermined to an appropriate distance in all directions utilizing iris scissors.
Rotation Flap Text: The defect edges were debeveled with a #15 scalpel blade.  Given the location of the defect, shape of the defect and the proximity to free margins a rotation flap was deemed most appropriate.  Using a sterile surgical marker, an appropriate rotation flap was drawn incorporating the defect and placing the expected incisions within the relaxed skin tension lines where possible.    The area thus outlined was incised deep to adipose tissue with a #15 scalpel blade.  The skin margins were undermined to an appropriate distance in all directions utilizing iris scissors.
Spiral Flap Text: The defect edges were debeveled with a #15 scalpel blade.  Given the location of the defect, shape of the defect and the proximity to free margins a spiral flap was deemed most appropriate.  Using a sterile surgical marker, an appropriate rotation flap was drawn incorporating the defect and placing the expected incisions within the relaxed skin tension lines where possible. The area thus outlined was incised deep to adipose tissue with a #15 scalpel blade.  The skin margins were undermined to an appropriate distance in all directions utilizing iris scissors.
Star Wedge Flap Text: The defect edges were debeveled with a #15 scalpel blade.  Given the location of the defect, shape of the defect and the proximity to free margins a star wedge flap was deemed most appropriate.  Using a sterile surgical marker, an appropriate rotation flap was drawn incorporating the defect and placing the expected incisions within the relaxed skin tension lines where possible. The area thus outlined was incised deep to adipose tissue with a #15 scalpel blade.  The skin margins were undermined to an appropriate distance in all directions utilizing iris scissors.
Transposition Flap Text: The defect edges were debeveled with a #15 scalpel blade.  Given the location of the defect and the proximity to free margins a transposition flap was deemed most appropriate.  Using a sterile surgical marker, an appropriate transposition flap was drawn incorporating the defect.    The area thus outlined was incised deep to adipose tissue with a #15 scalpel blade.  The skin margins were undermined to an appropriate distance in all directions utilizing iris scissors.
Muscle Hinge Flap Text: The defect edges were debeveled with a #15 scalpel blade.  Given the size, depth and location of the defect and the proximity to free margins a muscle hinge flap was deemed most appropriate.  Using a sterile surgical marker, an appropriate hinge flap was drawn incorporating the defect. The area thus outlined was incised with a #15 scalpel blade.  The skin margins were undermined to an appropriate distance in all directions utilizing iris scissors.
Melolabial Transposition Flap Text: The defect edges were debeveled with a #15 scalpel blade.  Given the location of the defect and the proximity to free margins a melolabial flap was deemed most appropriate.  Using a sterile surgical marker, an appropriate melolabial transposition flap was drawn incorporating the defect.    The area thus outlined was incised deep to adipose tissue with a #15 scalpel blade.  The skin margins were undermined to an appropriate distance in all directions utilizing iris scissors.
Rhombic Flap Text: The defect edges were debeveled with a #15 scalpel blade.  Given the location of the defect and the proximity to free margins a rhombic flap was deemed most appropriate.  Using a sterile surgical marker, an appropriate rhombic flap was drawn incorporating the defect.    The area thus outlined was incised deep to adipose tissue with a #15 scalpel blade.  The skin margins were undermined to an appropriate distance in all directions utilizing iris scissors.
Rhomboid Transposition Flap Text: The defect edges were debeveled with a #15 scalpel blade.  Given the location of the defect and the proximity to free margins a rhomboid transposition flap was deemed most appropriate.  Using a sterile surgical marker, an appropriate rhomboid flap was drawn incorporating the defect.    The area thus outlined was incised deep to adipose tissue with a #15 scalpel blade.  The skin margins were undermined to an appropriate distance in all directions utilizing iris scissors.
Bi-Rhombic Flap Text: The defect edges were debeveled with a #15 scalpel blade.  Given the location of the defect and the proximity to free margins a bi-rhombic flap was deemed most appropriate.  Using a sterile surgical marker, an appropriate rhombic flap was drawn incorporating the defect. The area thus outlined was incised deep to adipose tissue with a #15 scalpel blade.  The skin margins were undermined to an appropriate distance in all directions utilizing iris scissors.
Helical Rim Advancement Flap Text: The defect edges were debeveled with a #15 blade scalpel.  Given the location of the defect and the proximity to free margins (helical rim) a double helical rim advancement flap was deemed most appropriate.  Using a sterile surgical marker, the appropriate advancement flaps were drawn incorporating the defect and placing the expected incisions between the helical rim and antihelix where possible.  The area thus outlined was incised through and through with a #15 scalpel blade.  With a skin hook and iris scissors, the flaps were gently and sharply undermined and freed up.
Bilateral Helical Rim Advancement Flap Text: The defect edges were debeveled with a #15 blade scalpel.  Given the location of the defect and the proximity to free margins (helical rim) a bilateral helical rim advancement flap was deemed most appropriate.  Using a sterile surgical marker, the appropriate advancement flaps were drawn incorporating the defect and placing the expected incisions between the helical rim and antihelix where possible.  The area thus outlined was incised through and through with a #15 scalpel blade.  With a skin hook and iris scissors, the flaps were gently and sharply undermined and freed up.
Ear Star Wedge Flap Text: The defect edges were debeveled with a #15 blade scalpel.  Given the location of the defect and the proximity to free margins (helical rim) an ear star wedge flap was deemed most appropriate.  Using a sterile surgical marker, the appropriate flap was drawn incorporating the defect and placing the expected incisions between the helical rim and antihelix where possible.  The area thus outlined was incised through and through with a #15 scalpel blade.
Banner Transposition Flap Text: The defect edges were debeveled with a #15 scalpel blade.  Given the location of the defect and the proximity to free margins a Banner transposition flap was deemed most appropriate.  Using a sterile surgical marker, an appropriate flap drawn around the defect. The area thus outlined was incised deep to adipose tissue with a #15 scalpel blade.  The skin margins were undermined to an appropriate distance in all directions utilizing iris scissors.
Bilobed Flap Text: The defect edges were debeveled with a #15 scalpel blade.  Given the location of the defect and the proximity to free margins a bilobe flap was deemed most appropriate.  Using a sterile surgical marker, an appropriate bilobe flap drawn around the defect.    The area thus outlined was incised deep to adipose tissue with a #15 scalpel blade.  The skin margins were undermined to an appropriate distance in all directions utilizing iris scissors.
Bilobed Transposition Flap Text: The defect edges were debeveled with a #15 scalpel blade.  Given the location of the defect and the proximity to free margins a bilobed transposition flap was deemed most appropriate.  Using a sterile surgical marker, an appropriate bilobe flap drawn around the defect.    The area thus outlined was incised deep to adipose tissue with a #15 scalpel blade.  The skin margins were undermined to an appropriate distance in all directions utilizing iris scissors.
Trilobed Flap Text: The defect edges were debeveled with a #15 scalpel blade.  Given the location of the defect and the proximity to free margins a trilobed flap was deemed most appropriate.  Using a sterile surgical marker, an appropriate trilobed flap drawn around the defect.    The area thus outlined was incised deep to adipose tissue with a #15 scalpel blade.  The skin margins were undermined to an appropriate distance in all directions utilizing iris scissors.
Dorsal Nasal Flap Text: The defect edges were debeveled with a #15 scalpel blade.  Given the location of the defect and the proximity to free margins a dorsal nasal flap was deemed most appropriate.  Using a sterile surgical marker, an appropriate dorsal nasal flap was drawn around the defect.    The area thus outlined was incised deep to adipose tissue with a #15 scalpel blade.  The skin margins were undermined to an appropriate distance in all directions utilizing iris scissors.
Island Pedicle Flap Text: The defect edges were debeveled with a #15 scalpel blade.  Given the location of the defect, shape of the defect and the proximity to free margins an island pedicle advancement flap was deemed most appropriate.  Using a sterile surgical marker, an appropriate advancement flap was drawn incorporating the defect, outlining the appropriate donor tissue and placing the expected incisions within the relaxed skin tension lines where possible.    The area thus outlined was incised deep to adipose tissue with a #15 scalpel blade.  The skin margins were undermined to an appropriate distance in all directions around the primary defect and laterally outward around the island pedicle utilizing iris scissors.  There was minimal undermining beneath the pedicle flap.
Island Pedicle Flap With Canthal Suspension Text: The defect edges were debeveled with a #15 scalpel blade.  Given the location of the defect, shape of the defect and the proximity to free margins an island pedicle advancement flap was deemed most appropriate.  Using a sterile surgical marker, an appropriate advancement flap was drawn incorporating the defect, outlining the appropriate donor tissue and placing the expected incisions within the relaxed skin tension lines where possible. The area thus outlined was incised deep to adipose tissue with a #15 scalpel blade.  The skin margins were undermined to an appropriate distance in all directions around the primary defect and laterally outward around the island pedicle utilizing iris scissors.  There was minimal undermining beneath the pedicle flap. A suspension suture was placed in the canthal tendon to prevent tension and prevent ectropion.
Alar Island Pedicle Flap Text: The defect edges were debeveled with a #15 scalpel blade.  Given the location of the defect, shape of the defect and the proximity to the alar rim an island pedicle advancement flap was deemed most appropriate.  Using a sterile surgical marker, an appropriate advancement flap was drawn incorporating the defect, outlining the appropriate donor tissue and placing the expected incisions within the nasal ala running parallel to the alar rim. The area thus outlined was incised with a #15 scalpel blade.  The skin margins were undermined minimally to an appropriate distance in all directions around the primary defect and laterally outward around the island pedicle utilizing iris scissors.  There was minimal undermining beneath the pedicle flap.
Double Island Pedicle Flap Text: The defect edges were debeveled with a #15 scalpel blade.  Given the location of the defect, shape of the defect and the proximity to free margins a double island pedicle advancement flap was deemed most appropriate.  Using a sterile surgical marker, an appropriate advancement flap was drawn incorporating the defect, outlining the appropriate donor tissue and placing the expected incisions within the relaxed skin tension lines where possible.    The area thus outlined was incised deep to adipose tissue with a #15 scalpel blade.  The skin margins were undermined to an appropriate distance in all directions around the primary defect and laterally outward around the island pedicle utilizing iris scissors.  There was minimal undermining beneath the pedicle flap.
Island Pedicle Flap-Requiring Vessel Identification Text: The defect edges were debeveled with a #15 scalpel blade.  Given the location of the defect, shape of the defect and the proximity to free margins an island pedicle advancement flap was deemed most appropriate.  Using a sterile surgical marker, an appropriate advancement flap was drawn, based on the axial vessel mentioned above, incorporating the defect, outlining the appropriate donor tissue and placing the expected incisions within the relaxed skin tension lines where possible.    The area thus outlined was incised deep to adipose tissue with a #15 scalpel blade.  The skin margins were undermined to an appropriate distance in all directions around the primary defect and laterally outward around the island pedicle utilizing iris scissors.  There was minimal undermining beneath the pedicle flap.
Keystone Flap Text: The defect edges were debeveled with a #15 scalpel blade.  Given the location of the defect, shape of the defect a keystone flap was deemed most appropriate.  Using a sterile surgical marker, an appropriate keystone flap was drawn incorporating the defect, outlining the appropriate donor tissue and placing the expected incisions within the relaxed skin tension lines where possible. The area thus outlined was incised deep to adipose tissue with a #15 scalpel blade.  The skin margins were undermined to an appropriate distance in all directions around the primary defect and laterally outward around the flap utilizing iris scissors.
O-T Plasty Text: The defect edges were debeveled with a #15 scalpel blade.  Given the location of the defect, shape of the defect and the proximity to free margins an O-T plasty was deemed most appropriate.  Using a sterile surgical marker, an appropriate O-T plasty was drawn incorporating the defect and placing the expected incisions within the relaxed skin tension lines where possible.    The area thus outlined was incised deep to adipose tissue with a #15 scalpel blade.  The skin margins were undermined to an appropriate distance in all directions utilizing iris scissors.
O-Z Plasty Text: The defect edges were debeveled with a #15 scalpel blade.  Given the location of the defect, shape of the defect and the proximity to free margins an O-Z plasty (double transposition flap) was deemed most appropriate.  Using a sterile surgical marker, the appropriate transposition flaps were drawn incorporating the defect and placing the expected incisions within the relaxed skin tension lines where possible.    The area thus outlined was incised deep to adipose tissue with a #15 scalpel blade.  The skin margins were undermined to an appropriate distance in all directions utilizing iris scissors.  Hemostasis was achieved with electrocautery.  The flaps were then transposed into place, one clockwise and the other counterclockwise, and anchored with interrupted buried subcutaneous sutures.
Double O-Z Plasty Text: The defect edges were debeveled with a #15 scalpel blade.  Given the location of the defect, shape of the defect and the proximity to free margins a Double O-Z plasty (double transposition flap) was deemed most appropriate.  Using a sterile surgical marker, the appropriate transposition flaps were drawn incorporating the defect and placing the expected incisions within the relaxed skin tension lines where possible. The area thus outlined was incised deep to adipose tissue with a #15 scalpel blade.  The skin margins were undermined to an appropriate distance in all directions utilizing iris scissors.  Hemostasis was achieved with electrocautery.  The flaps were then transposed into place, one clockwise and the other counterclockwise, and anchored with interrupted buried subcutaneous sutures.
V-Y Plasty Text: The defect edges were debeveled with a #15 scalpel blade.  Given the location of the defect, shape of the defect and the proximity to free margins an V-Y advancement flap was deemed most appropriate.  Using a sterile surgical marker, an appropriate advancement flap was drawn incorporating the defect and placing the expected incisions within the relaxed skin tension lines where possible.    The area thus outlined was incised deep to adipose tissue with a #15 scalpel blade.  The skin margins were undermined to an appropriate distance in all directions utilizing iris scissors.
H Plasty Text: Given the location of the defect, shape of the defect and the proximity to free margins a H-plasty was deemed most appropriate for repair.  Using a sterile surgical marker, the appropriate advancement arms of the H-plasty were drawn incorporating the defect and placing the expected incisions within the relaxed skin tension lines where possible. The area thus outlined was incised deep to adipose tissue with a #15 scalpel blade. The skin margins were undermined to an appropriate distance in all directions utilizing iris scissors.  The opposing advancement arms were then advanced into place in opposite direction and anchored with interrupted buried subcutaneous sutures.
W Plasty Text: The lesion was extirpated to the level of the fat with a #15 scalpel blade.  Given the location of the defect, shape of the defect and the proximity to free margins a W-plasty was deemed most appropriate for repair.  Using a sterile surgical marker, the appropriate transposition arms of the W-plasty were drawn incorporating the defect and placing the expected incisions within the relaxed skin tension lines where possible.    The area thus outlined was incised deep to adipose tissue with a #15 scalpel blade.  The skin margins were undermined to an appropriate distance in all directions utilizing iris scissors.  The opposing transposition arms were then transposed into place in opposite direction and anchored with interrupted buried subcutaneous sutures.
Z Plasty Text: The lesion was extirpated to the level of the fat with a #15 scalpel blade.  Given the location of the defect, shape of the defect and the proximity to free margins a Z-plasty was deemed most appropriate for repair.  Using a sterile surgical marker, the appropriate transposition arms of the Z-plasty were drawn incorporating the defect and placing the expected incisions within the relaxed skin tension lines where possible.    The area thus outlined was incised deep to adipose tissue with a #15 scalpel blade.  The skin margins were undermined to an appropriate distance in all directions utilizing iris scissors.  The opposing transposition arms were then transposed into place in opposite direction and anchored with interrupted buried subcutaneous sutures.
Zygomaticofacial Flap Text: Given the location of the defect, shape of the defect and the proximity to free margins a zygomaticofacial flap was deemed most appropriate for repair.  Using a sterile surgical marker, the appropriate flap was drawn incorporating the defect and placing the expected incisions within the relaxed skin tension lines where possible. The area thus outlined was incised deep to adipose tissue with a #15 scalpel blade with preservation of a vascular pedicle.  The skin margins were undermined to an appropriate distance in all directions utilizing iris scissors.  The flap was then placed into the defect and anchored with interrupted buried subcutaneous sutures.
Cheek Interpolation Flap Text: A decision was made to reconstruct the defect utilizing an interpolation axial flap and a staged reconstruction.  A telfa template was made of the defect.  This telfa template was then used to outline the Cheek Interpolation flap.  The donor area for the pedicle flap was then injected with anesthesia.  The flap was excised through the skin and subcutaneous tissue down to the layer of the underlying musculature.  The interpolation flap was carefully excised within this deep plane to maintain its blood supply.  The edges of the donor site were undermined.   The donor site was closed in a primary fashion.  The pedicle was then rotated into position and sutured.  Once the tube was sutured into place, adequate blood supply was confirmed with blanching and refill.  The pedicle was then wrapped with xeroform gauze and dressed appropriately with a telfa and gauze bandage to ensure continued blood supply and protect the attached pedicle.
Cheek-To-Nose Interpolation Flap Text: A decision was made to reconstruct the defect utilizing an interpolation axial flap and a staged reconstruction.  A telfa template was made of the defect.  This telfa template was then used to outline the Cheek-To-Nose Interpolation flap.  The donor area for the pedicle flap was then injected with anesthesia.  The flap was excised through the skin and subcutaneous tissue down to the layer of the underlying musculature.  The interpolation flap was carefully excised within this deep plane to maintain its blood supply.  The edges of the donor site were undermined.   The donor site was closed in a primary fashion.  The pedicle was then rotated into position and sutured.  Once the tube was sutured into place, adequate blood supply was confirmed with blanching and refill.  The pedicle was then wrapped with xeroform gauze and dressed appropriately with a telfa and gauze bandage to ensure continued blood supply and protect the attached pedicle.
Interpolation Flap Text: A decision was made to reconstruct the defect utilizing an interpolation axial flap and a staged reconstruction.  A telfa template was made of the defect.  This telfa template was then used to outline the interpolation flap.  The donor area for the pedicle flap was then injected with anesthesia.  The flap was excised through the skin and subcutaneous tissue down to the layer of the underlying musculature.  The interpolation flap was carefully excised within this deep plane to maintain its blood supply.  The edges of the donor site were undermined.   The donor site was closed in a primary fashion.  The pedicle was then rotated into position and sutured.  Once the tube was sutured into place, adequate blood supply was confirmed with blanching and refill.  The pedicle was then wrapped with xeroform gauze and dressed appropriately with a telfa and gauze bandage to ensure continued blood supply and protect the attached pedicle.
Melolabial Interpolation Flap Text: A decision was made to reconstruct the defect utilizing an interpolation axial flap and a staged reconstruction.  A telfa template was made of the defect.  This telfa template was then used to outline the melolabial interpolation flap.  The donor area for the pedicle flap was then injected with anesthesia.  The flap was excised through the skin and subcutaneous tissue down to the layer of the underlying musculature.  The pedicle flap was carefully excised within this deep plane to maintain its blood supply.  The edges of the donor site were undermined.   The donor site was closed in a primary fashion.  The pedicle was then rotated into position and sutured.  Once the tube was sutured into place, adequate blood supply was confirmed with blanching and refill.  The pedicle was then wrapped with xeroform gauze and dressed appropriately with a telfa and gauze bandage to ensure continued blood supply and protect the attached pedicle.
Mastoid Interpolation Flap Text: A decision was made to reconstruct the defect utilizing an interpolation axial flap and a staged reconstruction.  A telfa template was made of the defect.  This telfa template was then used to outline the mastoid interpolation flap.  The donor area for the pedicle flap was then injected with anesthesia.  The flap was excised through the skin and subcutaneous tissue down to the layer of the underlying musculature.  The pedicle flap was carefully excised within this deep plane to maintain its blood supply.  The edges of the donor site were undermined.   The donor site was closed in a primary fashion.  The pedicle was then rotated into position and sutured.  Once the tube was sutured into place, adequate blood supply was confirmed with blanching and refill.  The pedicle was then wrapped with xeroform gauze and dressed appropriately with a telfa and gauze bandage to ensure continued blood supply and protect the attached pedicle.
Posterior Auricular Interpolation Flap Text: A decision was made to reconstruct the defect utilizing an interpolation axial flap and a staged reconstruction.  A telfa template was made of the defect.  This telfa template was then used to outline the posterior auricular interpolation flap.  The donor area for the pedicle flap was then injected with anesthesia.  The flap was excised through the skin and subcutaneous tissue down to the layer of the underlying musculature.  The pedicle flap was carefully excised within this deep plane to maintain its blood supply.  The edges of the donor site were undermined.   The donor site was closed in a primary fashion.  The pedicle was then rotated into position and sutured.  Once the tube was sutured into place, adequate blood supply was confirmed with blanching and refill.  The pedicle was then wrapped with xeroform gauze and dressed appropriately with a telfa and gauze bandage to ensure continued blood supply and protect the attached pedicle.
Paramedian Forehead Flap Text: A decision was made to reconstruct the defect utilizing an interpolation axial flap and a staged reconstruction.  A telfa template was made of the defect.  This telfa template was then used to outline the paramedian forehead pedicle flap.  The donor area for the pedicle flap was then injected with anesthesia.  The flap was excised through the skin and subcutaneous tissue down to the layer of the underlying musculature.  The pedicle flap was carefully excised within this deep plane to maintain its blood supply.  The edges of the donor site were undermined.   The donor site was closed in a primary fashion.  The pedicle was then rotated into position and sutured.  Once the tube was sutured into place, adequate blood supply was confirmed with blanching and refill.  The pedicle was then wrapped with xeroform gauze and dressed appropriately with a telfa and gauze bandage to ensure continued blood supply and protect the attached pedicle.
Lip Wedge Excision Repair Text: Given the location of the defect and the proximity to free margins a full thickness wedge repair was deemed most appropriate.  Using a sterile surgical marker, the appropriate repair was drawn incorporating the defect and placing the expected incisions perpendicular to the vermilion border.  The vermilion border was also meticulously outlined to ensure appropriate reapproximation during the repair.  The area thus outlined was incised through and through with a #15 scalpel blade.  The muscularis and dermis were reaproximated with deep sutures following hemostasis. Care was taken to realign the vermilion border before proceeding with the superficial closure.  Once the vermilion was realigned the superfical and mucosal closure was finished.
Ftsg Text: The defect edges were debeveled with a #15 scalpel blade.  Given the location of the defect, shape of the defect and the proximity to free margins a full thickness skin graft was deemed most appropriate.  Using a sterile surgical marker, the primary defect shape was transferred to the donor site. The area thus outlined was incised deep to adipose tissue with a #15 scalpel blade.  The harvested graft was then trimmed of adipose tissue until only dermis and epidermis was left.  The skin margins of the secondary defect were undermined to an appropriate distance in all directions utilizing iris scissors.  The secondary defect was closed with interrupted buried subcutaneous sutures.  The skin edges were then re-apposed with running  sutures.  The skin graft was then placed in the primary defect and oriented appropriately.
Split-Thickness Skin Graft Text: The defect edges were debeveled with a #15 scalpel blade.  Given the location of the defect, shape of the defect and the proximity to free margins a split thickness skin graft was deemed most appropriate.  Using a sterile surgical marker, the primary defect shape was transferred to the donor site. The split thickness graft was then harvested.  The skin graft was then placed in the primary defect and oriented appropriately.
Burow's Graft Text: The defect edges were debeveled with a #15 scalpel blade.  Given the location of the defect, shape of the defect, the proximity to free margins and the presence of a standing cone deformity a Burow's skin graft was deemed most appropriate. The standing cone was removed and this tissue was then trimmed to the shape of the primary defect. The adipose tissue was also removed until only dermis and epidermis were left.  The skin margins of the secondary defect were undermined to an appropriate distance in all directions utilizing iris scissors.  The secondary defect was closed with interrupted buried subcutaneous sutures.  The skin edges were then re-apposed with running  sutures.  The skin graft was then placed in the primary defect and oriented appropriately.
Cartilage Graft Text: The defect edges were debeveled with a #15 scalpel blade.  Given the location of the defect, shape of the defect, the fact the defect involved a full thickness cartilage defect a cartilage graft was deemed most appropriate.  An appropriate donor site was identified, cleansed, and anesthetized. The cartilage graft was then harvested and transferred to the recipient site, oriented appropriately and then sutured into place.  The secondary defect was then repaired using a primary closure.
Composite Graft Text: The defect edges were debeveled with a #15 scalpel blade.  Given the location of the defect, shape of the defect, the proximity to free margins and the fact the defect was full thickness a composite graft was deemed most appropriate.  The defect was outline and then transferred to the donor site.  A full thickness graft was then excised from the donor site. The graft was then placed in the primary defect, oriented appropriately and then sutured into place.  The secondary defect was then repaired using a primary closure.
Epidermal Autograft Text: The defect edges were debeveled with a #15 scalpel blade.  Given the location of the defect, shape of the defect and the proximity to free margins an epidermal autograft was deemed most appropriate.  Using a sterile surgical marker, the primary defect shape was transferred to the donor site. The epidermal graft was then harvested.  The skin graft was then placed in the primary defect and oriented appropriately.
Dermal Autograft Text: The defect edges were debeveled with a #15 scalpel blade.  Given the location of the defect, shape of the defect and the proximity to free margins a dermal autograft was deemed most appropriate.  Using a sterile surgical marker, the primary defect shape was transferred to the donor site. The area thus outlined was incised deep to adipose tissue with a #15 scalpel blade.  The harvested graft was then trimmed of adipose and epidermal tissue until only dermis was left.  The skin graft was then placed in the primary defect and oriented appropriately.
Skin Substitute Text: The defect edges were debeveled with a #15 scalpel blade.  Given the location of the defect, shape of the defect and the proximity to free margins a skin substitute graft was deemed most appropriate.  The graft material was trimmed to fit the size of the defect. The graft was then placed in the primary defect and oriented appropriately.
Tissue Cultured Epidermal Autograft Text: The defect edges were debeveled with a #15 scalpel blade.  Given the location of the defect, shape of the defect and the proximity to free margins a tissue cultured epidermal autograft was deemed most appropriate.  The graft was then trimmed to fit the size of the defect.  The graft was then placed in the primary defect and oriented appropriately.
Xenograft Text: The defect edges were debeveled with a #15 scalpel blade.  Given the location of the defect, shape of the defect and the proximity to free margins a xenograft was deemed most appropriate.  The graft was then trimmed to fit the size of the defect.  The graft was then placed in the primary defect and oriented appropriately.
Purse String (Intermediate) Text: Given the location of the defect and the characteristics of the surrounding skin a purse string intermediate closure was deemed most appropriate.  Undermining was performed circumfirentially around the surgical defect.  A purse string suture was then placed and tightened.
Purse String (Simple) Text: Given the location of the defect and the characteristics of the surrounding skin a purse string simple closure was deemed most appropriate.  Undermining was performed circumferentially around the surgical defect.  A purse string suture was then placed and tightened.
Partial Purse String (Intermediate) Text: Given the location of the defect and the characteristics of the surrounding skin an intermediate purse string closure was deemed most appropriate.  Undermining was performed circumferentially around the surgical defect.  A purse string suture was then placed and tightened. Wound tension of the circular defect prevented complete closure of the wound.
Partial Purse String (Simple) Text: Given the location of the defect and the characteristics of the surrounding skin a simple purse string closure was deemed most appropriate.  Undermining was performed circumferentially around the surgical defect.  A purse string suture was then placed and tightened. Wound tension of the circular defect prevented complete closure of the wound.
Complex Repair And Single Advancement Flap Text: The defect edges were debeveled with a #15 scalpel blade.  The primary defect was closed partially with a complex linear closure.  Given the location of the remaining defect, shape of the defect and the proximity to free margins a single advancement flap was deemed most appropriate for complete closure of the defect.  Using a sterile surgical marker, an appropriate advancement flap was drawn incorporating the defect and placing the expected incisions within the relaxed skin tension lines where possible.    The area thus outlined was incised deep to adipose tissue with a #15 scalpel blade.  The skin margins were undermined to an appropriate distance in all directions utilizing iris scissors.
Complex Repair And Double Advancement Flap Text: The defect edges were debeveled with a #15 scalpel blade.  The primary defect was closed partially with a complex linear closure.  Given the location of the remaining defect, shape of the defect and the proximity to free margins a double advancement flap was deemed most appropriate for complete closure of the defect.  Using a sterile surgical marker, an appropriate advancement flap was drawn incorporating the defect and placing the expected incisions within the relaxed skin tension lines where possible.    The area thus outlined was incised deep to adipose tissue with a #15 scalpel blade.  The skin margins were undermined to an appropriate distance in all directions utilizing iris scissors.
Complex Repair And Modified Advancement Flap Text: The defect edges were debeveled with a #15 scalpel blade.  The primary defect was closed partially with a complex linear closure.  Given the location of the remaining defect, shape of the defect and the proximity to free margins a modified advancement flap was deemed most appropriate for complete closure of the defect.  Using a sterile surgical marker, an appropriate advancement flap was drawn incorporating the defect and placing the expected incisions within the relaxed skin tension lines where possible.    The area thus outlined was incised deep to adipose tissue with a #15 scalpel blade.  The skin margins were undermined to an appropriate distance in all directions utilizing iris scissors.
Complex Repair And A-T Advancement Flap Text: The defect edges were debeveled with a #15 scalpel blade.  The primary defect was closed partially with a complex linear closure.  Given the location of the remaining defect, shape of the defect and the proximity to free margins an A-T advancement flap was deemed most appropriate for complete closure of the defect.  Using a sterile surgical marker, an appropriate advancement flap was drawn incorporating the defect and placing the expected incisions within the relaxed skin tension lines where possible.    The area thus outlined was incised deep to adipose tissue with a #15 scalpel blade.  The skin margins were undermined to an appropriate distance in all directions utilizing iris scissors.
Complex Repair And O-T Advancement Flap Text: The defect edges were debeveled with a #15 scalpel blade.  The primary defect was closed partially with a complex linear closure.  Given the location of the remaining defect, shape of the defect and the proximity to free margins an O-T advancement flap was deemed most appropriate for complete closure of the defect.  Using a sterile surgical marker, an appropriate advancement flap was drawn incorporating the defect and placing the expected incisions within the relaxed skin tension lines where possible.    The area thus outlined was incised deep to adipose tissue with a #15 scalpel blade.  The skin margins were undermined to an appropriate distance in all directions utilizing iris scissors.
Complex Repair And O-L Flap Text: The defect edges were debeveled with a #15 scalpel blade.  The primary defect was closed partially with a complex linear closure.  Given the location of the remaining defect, shape of the defect and the proximity to free margins an O-L flap was deemed most appropriate for complete closure of the defect.  Using a sterile surgical marker, an appropriate flap was drawn incorporating the defect and placing the expected incisions within the relaxed skin tension lines where possible.    The area thus outlined was incised deep to adipose tissue with a #15 scalpel blade.  The skin margins were undermined to an appropriate distance in all directions utilizing iris scissors.
Complex Repair And Bilobe Flap Text: The defect edges were debeveled with a #15 scalpel blade.  The primary defect was closed partially with a complex linear closure.  Given the location of the remaining defect, shape of the defect and the proximity to free margins a bilobe flap was deemed most appropriate for complete closure of the defect.  Using a sterile surgical marker, an appropriate advancement flap was drawn incorporating the defect and placing the expected incisions within the relaxed skin tension lines where possible.    The area thus outlined was incised deep to adipose tissue with a #15 scalpel blade.  The skin margins were undermined to an appropriate distance in all directions utilizing iris scissors.
Complex Repair And Melolabial Flap Text: The defect edges were debeveled with a #15 scalpel blade.  The primary defect was closed partially with a complex linear closure.  Given the location of the remaining defect, shape of the defect and the proximity to free margins a melolabial flap was deemed most appropriate for complete closure of the defect.  Using a sterile surgical marker, an appropriate advancement flap was drawn incorporating the defect and placing the expected incisions within the relaxed skin tension lines where possible.    The area thus outlined was incised deep to adipose tissue with a #15 scalpel blade.  The skin margins were undermined to an appropriate distance in all directions utilizing iris scissors.
Complex Repair And Rotation Flap Text: The defect edges were debeveled with a #15 scalpel blade.  The primary defect was closed partially with a complex linear closure.  Given the location of the remaining defect, shape of the defect and the proximity to free margins a rotation flap was deemed most appropriate for complete closure of the defect.  Using a sterile surgical marker, an appropriate advancement flap was drawn incorporating the defect and placing the expected incisions within the relaxed skin tension lines where possible.    The area thus outlined was incised deep to adipose tissue with a #15 scalpel blade.  The skin margins were undermined to an appropriate distance in all directions utilizing iris scissors.
Complex Repair And Rhombic Flap Text: The defect edges were debeveled with a #15 scalpel blade.  The primary defect was closed partially with a complex linear closure.  Given the location of the remaining defect, shape of the defect and the proximity to free margins a rhombic flap was deemed most appropriate for complete closure of the defect.  Using a sterile surgical marker, an appropriate advancement flap was drawn incorporating the defect and placing the expected incisions within the relaxed skin tension lines where possible.    The area thus outlined was incised deep to adipose tissue with a #15 scalpel blade.  The skin margins were undermined to an appropriate distance in all directions utilizing iris scissors.
Complex Repair And Transposition Flap Text: The defect edges were debeveled with a #15 scalpel blade.  The primary defect was closed partially with a complex linear closure.  Given the location of the remaining defect, shape of the defect and the proximity to free margins a transposition flap was deemed most appropriate for complete closure of the defect.  Using a sterile surgical marker, an appropriate advancement flap was drawn incorporating the defect and placing the expected incisions within the relaxed skin tension lines where possible.    The area thus outlined was incised deep to adipose tissue with a #15 scalpel blade.  The skin margins were undermined to an appropriate distance in all directions utilizing iris scissors.
Complex Repair And V-Y Plasty Text: The defect edges were debeveled with a #15 scalpel blade.  The primary defect was closed partially with a complex linear closure.  Given the location of the remaining defect, shape of the defect and the proximity to free margins a V-Y plasty was deemed most appropriate for complete closure of the defect.  Using a sterile surgical marker, an appropriate advancement flap was drawn incorporating the defect and placing the expected incisions within the relaxed skin tension lines where possible.    The area thus outlined was incised deep to adipose tissue with a #15 scalpel blade.  The skin margins were undermined to an appropriate distance in all directions utilizing iris scissors.
Complex Repair And M Plasty Text: The defect edges were debeveled with a #15 scalpel blade.  The primary defect was closed partially with a complex linear closure.  Given the location of the remaining defect, shape of the defect and the proximity to free margins an M plasty was deemed most appropriate for complete closure of the defect.  Using a sterile surgical marker, an appropriate advancement flap was drawn incorporating the defect and placing the expected incisions within the relaxed skin tension lines where possible.    The area thus outlined was incised deep to adipose tissue with a #15 scalpel blade.  The skin margins were undermined to an appropriate distance in all directions utilizing iris scissors.
Complex Repair And Double M Plasty Text: The defect edges were debeveled with a #15 scalpel blade.  The primary defect was closed partially with a complex linear closure.  Given the location of the remaining defect, shape of the defect and the proximity to free margins a double M plasty was deemed most appropriate for complete closure of the defect.  Using a sterile surgical marker, an appropriate advancement flap was drawn incorporating the defect and placing the expected incisions within the relaxed skin tension lines where possible.    The area thus outlined was incised deep to adipose tissue with a #15 scalpel blade.  The skin margins were undermined to an appropriate distance in all directions utilizing iris scissors.
Complex Repair And W Plasty Text: The defect edges were debeveled with a #15 scalpel blade.  The primary defect was closed partially with a complex linear closure.  Given the location of the remaining defect, shape of the defect and the proximity to free margins a W plasty was deemed most appropriate for complete closure of the defect.  Using a sterile surgical marker, an appropriate advancement flap was drawn incorporating the defect and placing the expected incisions within the relaxed skin tension lines where possible.    The area thus outlined was incised deep to adipose tissue with a #15 scalpel blade.  The skin margins were undermined to an appropriate distance in all directions utilizing iris scissors.
Complex Repair And Z Plasty Text: The defect edges were debeveled with a #15 scalpel blade.  The primary defect was closed partially with a complex linear closure.  Given the location of the remaining defect, shape of the defect and the proximity to free margins a Z plasty was deemed most appropriate for complete closure of the defect.  Using a sterile surgical marker, an appropriate advancement flap was drawn incorporating the defect and placing the expected incisions within the relaxed skin tension lines where possible.    The area thus outlined was incised deep to adipose tissue with a #15 scalpel blade.  The skin margins were undermined to an appropriate distance in all directions utilizing iris scissors.
Complex Repair And Dorsal Nasal Flap Text: The defect edges were debeveled with a #15 scalpel blade.  The primary defect was closed partially with a complex linear closure.  Given the location of the remaining defect, shape of the defect and the proximity to free margins a dorsal nasal flap was deemed most appropriate for complete closure of the defect.  Using a sterile surgical marker, an appropriate flap was drawn incorporating the defect and placing the expected incisions within the relaxed skin tension lines where possible.    The area thus outlined was incised deep to adipose tissue with a #15 scalpel blade.  The skin margins were undermined to an appropriate distance in all directions utilizing iris scissors.
Complex Repair And Ftsg Text: The defect edges were debeveled with a #15 scalpel blade.  The primary defect was closed partially with a complex linear closure.  Given the location of the defect, shape of the defect and the proximity to free margins a full thickness skin graft was deemed most appropriate to repair the remaining defect.  The graft was trimmed to fit the size of the remaining defect.  The graft was then placed in the primary defect, oriented appropriately, and sutured into place.
Complex Repair And Burow's Graft Text: The defect edges were debeveled with a #15 scalpel blade.  The primary defect was closed partially with a complex linear closure.  Given the location of the defect, shape of the defect, the proximity to free margins and the presence of a standing cone deformity a Burow's graft was deemed most appropriate to repair the remaining defect.  The graft was trimmed to fit the size of the remaining defect.  The graft was then placed in the primary defect, oriented appropriately, and sutured into place.
Complex Repair And Split-Thickness Skin Graft Text: The defect edges were debeveled with a #15 scalpel blade.  The primary defect was closed partially with a complex linear closure.  Given the location of the defect, shape of the defect and the proximity to free margins a split thickness skin graft was deemed most appropriate to repair the remaining defect.  The graft was trimmed to fit the size of the remaining defect.  The graft was then placed in the primary defect, oriented appropriately, and sutured into place.
Complex Repair And Epidermal Autograft Text: The defect edges were debeveled with a #15 scalpel blade.  The primary defect was closed partially with a complex linear closure.  Given the location of the defect, shape of the defect and the proximity to free margins an epidermal autograft was deemed most appropriate to repair the remaining defect.  The graft was trimmed to fit the size of the remaining defect.  The graft was then placed in the primary defect, oriented appropriately, and sutured into place.
Complex Repair And Dermal Autograft Text: The defect edges were debeveled with a #15 scalpel blade.  The primary defect was closed partially with a complex linear closure.  Given the location of the defect, shape of the defect and the proximity to free margins an dermal autograft was deemed most appropriate to repair the remaining defect.  The graft was trimmed to fit the size of the remaining defect.  The graft was then placed in the primary defect, oriented appropriately, and sutured into place.
Complex Repair And Tissue Cultured Epidermal Autograft Text: The defect edges were debeveled with a #15 scalpel blade.  The primary defect was closed partially with a complex linear closure.  Given the location of the defect, shape of the defect and the proximity to free margins an tissue cultured epidermal autograft was deemed most appropriate to repair the remaining defect.  The graft was trimmed to fit the size of the remaining defect.  The graft was then placed in the primary defect, oriented appropriately, and sutured into place.
Complex Repair And Xenograft Text: The defect edges were debeveled with a #15 scalpel blade.  The primary defect was closed partially with a complex linear closure.  Given the location of the defect, shape of the defect and the proximity to free margins a xenograft was deemed most appropriate to repair the remaining defect.  The graft was trimmed to fit the size of the remaining defect.  The graft was then placed in the primary defect, oriented appropriately, and sutured into place.
Complex Repair And Skin Substitute Graft Text: The defect edges were debeveled with a #15 scalpel blade.  The primary defect was closed partially with a complex linear closure.  Given the location of the remaining defect, shape of the defect and the proximity to free margins a skin substitute graft was deemed most appropriate to repair the remaining defect.  The graft was trimmed to fit the size of the remaining defect.  The graft was then placed in the primary defect, oriented appropriately, and sutured into place.
Path Notes (To The Dermatopathologist): Please check margins. Suture marking the 12 o'clock margin.
Consent was obtained from the patient. The risks and benefits to therapy were discussed in detail. Specifically, the risks of infection, scarring, bleeding, prolonged wound healing, incomplete removal, allergy to anesthesia, nerve injury and recurrence were addressed. Prior to the procedure, the treatment site was clearly identified and confirmed by the patient. All components of Universal Protocol/PAUSE Rule completed.
Post-Care Instructions: I reviewed with the patient in detail post-care instructions. Patient is not to engage in any heavy lifting, exercise, or swimming for the next 14 days. Should the patient develop any fevers, chills, bleeding, severe pain patient will contact the office immediately.
Home Suture Removal Text: Patient was provided a home suture removal kit and will remove their sutures at home.  If they have any questions or difficulties they will call the office.
Where Do You Want The Question To Include Opioid Counseling Located?: Case Summary Tab
Information: Selecting Yes will display possible errors in your note based on the variables you have selected. This validation is only offered as a suggestion for you. PLEASE NOTE THAT THE VALIDATION TEXT WILL BE REMOVED WHEN YOU FINALIZE YOUR NOTE. IF YOU WANT TO FAX A PRELIMINARY NOTE YOU WILL NEED TO TOGGLE THIS TO 'NO' IF YOU DO NOT WANT IT IN YOUR FAXED NOTE.

## 2020-09-23 NOTE — PROCEDURE: CURETTAGE AND DESTRUCTION
Detail Level: Detailed
Number Of Curettages: 3
Size Of Lesion In Cm: 0.4
Size Of Lesion After Curettage: 1
Add Intralesional Injection: No
Concentration (Mg/Ml Or Millions Of Plaque Forming Units/Cc): 0.01
Anesthesia Type: 1% lidocaine with epinephrine and a 1:10 solution of 8.4% sodium bicarbonate
Cautery Type: electrodesiccation
What Was Performed First?: Curettage
Additional Information: (Optional): The wound was cleaned, and a pressure dressing was applied.  The patient received detailed post-op instructions.
Consent was obtained from the patient. The risks, benefits and alternatives to therapy were discussed in detail. Specifically, the risks of infection, scarring, bleeding, prolonged wound healing, nerve injury, incomplete removal, allergy to anesthesia and recurrence were addressed. Alternatives to ED&C, such as: surgical removal and XRT were also discussed.  Prior to the procedure, the treatment site was clearly identified and confirmed by the patient. All components of Universal Protocol/PAUSE Rule completed.
Post-Care Instructions: I reviewed with the patient in detail post-care instructions. Patient is to keep the area dry for 48 hours, and not to engage in any swimming until the area is healed. Should the patient develop any fevers, chills, bleeding, severe pain patient will contact the office immediately.
Bill As A Line Item Or As Units: Line Item

## 2020-09-23 NOTE — HPI: PROCEDURE (SKIN SURGERY)
Has The Growth Been Previously Biopsied?: has been previously biopsied
Body Location Override (Optional): Left distal posterior upper arm.

## 2020-10-07 ENCOUNTER — APPOINTMENT (RX ONLY)
Dept: URBAN - METROPOLITAN AREA CLINIC 22 | Facility: CLINIC | Age: 85
Setting detail: DERMATOLOGY
End: 2020-10-07

## 2020-10-07 DIAGNOSIS — Z48.817 ENCOUNTER FOR SURGICAL AFTERCARE FOLLOWING SURGERY ON THE SKIN AND SUBCUTANEOUS TISSUE: ICD-10-CM

## 2020-10-07 DIAGNOSIS — Z48.02 ENCOUNTER FOR REMOVAL OF SUTURES: ICD-10-CM

## 2020-10-07 PROCEDURE — ? POST-OP WOUND EVALUATION

## 2020-10-07 PROCEDURE — 99024 POSTOP FOLLOW-UP VISIT: CPT

## 2020-10-07 PROCEDURE — ? SUTURE REMOVAL (GLOBAL PERIOD)

## 2020-10-07 ASSESSMENT — LOCATION SIMPLE DESCRIPTION DERM: LOCATION SIMPLE: LEFT UPPER ARM

## 2020-10-07 ASSESSMENT — LOCATION DETAILED DESCRIPTION DERM: LOCATION DETAILED: LEFT DISTAL POSTERIOR UPPER ARM

## 2020-10-07 ASSESSMENT — LOCATION ZONE DERM: LOCATION ZONE: ARM

## 2020-10-07 NOTE — PROCEDURE: SUTURE REMOVAL (GLOBAL PERIOD)
Detail Level: Detailed
Add 46991 Cpt? (Important Note: In 2017 The Use Of 70271 Is Being Tracked By Cms To Determine Future Global Period Reimbursement For Global Periods): yes

## 2020-10-07 NOTE — PROCEDURE: POST-OP WOUND EVALUATION
Detail Level: Detailed
Add 13756 Cpt? (Important Note: In 2017 The Use Of 70694 Is Being Tracked By Cms To Determine Future Global Period Reimbursement For Global Periods): yes
Wound Evaluated By (Optional): Isaiah Vaughn MD
Wound Diameter In Cm(Optional): 0
Wound Crusting?: clean
Sutures?: intact
Wound Color?: pink
Any New Or Residual Neoplasm?: No
Patient To Follow-Up With?: our clinic
Follow Up Units (Optional): 1
Follow Up Time Frame (Optional): months

## 2020-11-04 ENCOUNTER — APPOINTMENT (RX ONLY)
Dept: URBAN - METROPOLITAN AREA CLINIC 22 | Facility: CLINIC | Age: 85
Setting detail: DERMATOLOGY
End: 2020-11-04

## 2020-11-04 DIAGNOSIS — L57.0 ACTINIC KERATOSIS: ICD-10-CM

## 2020-11-04 DIAGNOSIS — Z48.817 ENCOUNTER FOR SURGICAL AFTERCARE FOLLOWING SURGERY ON THE SKIN AND SUBCUTANEOUS TISSUE: ICD-10-CM

## 2020-11-04 PROCEDURE — ? POST-OP WOUND EVALUATION

## 2020-11-04 PROCEDURE — 99024 POSTOP FOLLOW-UP VISIT: CPT

## 2020-11-04 PROCEDURE — 17003 DESTRUCT PREMALG LES 2-14: CPT

## 2020-11-04 PROCEDURE — ? LIQUID NITROGEN

## 2020-11-04 PROCEDURE — 17000 DESTRUCT PREMALG LESION: CPT

## 2020-11-04 ASSESSMENT — LOCATION SIMPLE DESCRIPTION DERM
LOCATION SIMPLE: RIGHT CHEEK
LOCATION SIMPLE: LEFT UPPER ARM
LOCATION SIMPLE: LEFT CHEEK

## 2020-11-04 ASSESSMENT — LOCATION DETAILED DESCRIPTION DERM
LOCATION DETAILED: LEFT DISTAL POSTERIOR UPPER ARM
LOCATION DETAILED: RIGHT INFERIOR MEDIAL MALAR CHEEK
LOCATION DETAILED: LEFT INFERIOR MEDIAL MALAR CHEEK

## 2020-11-04 ASSESSMENT — LOCATION ZONE DERM
LOCATION ZONE: FACE
LOCATION ZONE: ARM

## 2020-11-04 NOTE — PROCEDURE: LIQUID NITROGEN
Render Post-Care Instructions In Note?: yes
Detail Level: Detailed
Number Of Freeze-Thaw Cycles: 1 freeze-thaw cycle
Post-Care Instructions: I reviewed with the patient in detail post-care instructions. Patient is to wear sunprotection, and avoid picking at any of the treated lesions. Pt may apply Vaseline to crusted or scabbing areas.
Render Note In Bullet Format When Appropriate: No
Consent: The patient's consent was obtained including but not limited to risks of crusting, scabbing, blistering, scarring, darker or lighter pigmentary change, recurrence, incomplete removal and infection.
Duration Of Freeze Thaw-Cycle (Seconds): 5

## 2020-11-04 NOTE — PROCEDURE: POST-OP WOUND EVALUATION
Detail Level: Detailed
Add 54924 Cpt? (Important Note: In 2017 The Use Of 26925 Is Being Tracked By Cms To Determine Future Global Period Reimbursement For Global Periods): yes
Wound Evaluated By (Optional): Isaiah Vaughn MD
Wound Diameter In Cm(Optional): 0
Wound Crusting?: clean
Wound Color?: pink
Any New Or Residual Neoplasm?: No
Patient To Follow-Up With?: their primary dermatologist

## 2021-01-13 ENCOUNTER — APPOINTMENT (RX ONLY)
Dept: URBAN - METROPOLITAN AREA CLINIC 22 | Facility: CLINIC | Age: 86
Setting detail: DERMATOLOGY
End: 2021-01-13

## 2021-01-13 DIAGNOSIS — Z85.828 PERSONAL HISTORY OF OTHER MALIGNANT NEOPLASM OF SKIN: ICD-10-CM

## 2021-01-13 DIAGNOSIS — L82.1 OTHER SEBORRHEIC KERATOSIS: ICD-10-CM

## 2021-01-13 DIAGNOSIS — D18.0 HEMANGIOMA: ICD-10-CM

## 2021-01-13 DIAGNOSIS — L57.0 ACTINIC KERATOSIS: ICD-10-CM

## 2021-01-13 DIAGNOSIS — D22 MELANOCYTIC NEVI: ICD-10-CM

## 2021-01-13 DIAGNOSIS — Z71.89 OTHER SPECIFIED COUNSELING: ICD-10-CM

## 2021-01-13 DIAGNOSIS — L81.4 OTHER MELANIN HYPERPIGMENTATION: ICD-10-CM

## 2021-01-13 PROBLEM — D48.5 NEOPLASM OF UNCERTAIN BEHAVIOR OF SKIN: Status: ACTIVE | Noted: 2021-01-13

## 2021-01-13 PROBLEM — D18.01 HEMANGIOMA OF SKIN AND SUBCUTANEOUS TISSUE: Status: ACTIVE | Noted: 2021-01-13

## 2021-01-13 PROBLEM — D22.5 MELANOCYTIC NEVI OF TRUNK: Status: ACTIVE | Noted: 2021-01-13

## 2021-01-13 PROCEDURE — ? LIQUID NITROGEN

## 2021-01-13 PROCEDURE — 11102 TANGNTL BX SKIN SINGLE LES: CPT

## 2021-01-13 PROCEDURE — 99213 OFFICE O/P EST LOW 20 MIN: CPT | Mod: 25

## 2021-01-13 PROCEDURE — 17003 DESTRUCT PREMALG LES 2-14: CPT

## 2021-01-13 PROCEDURE — 11103 TANGNTL BX SKIN EA SEP/ADDL: CPT

## 2021-01-13 PROCEDURE — ? BIOPSY BY SHAVE METHOD

## 2021-01-13 PROCEDURE — ? COUNSELING

## 2021-01-13 PROCEDURE — 17000 DESTRUCT PREMALG LESION: CPT | Mod: 59

## 2021-01-13 ASSESSMENT — LOCATION SIMPLE DESCRIPTION DERM
LOCATION SIMPLE: LEFT EAR
LOCATION SIMPLE: LEFT FOREARM
LOCATION SIMPLE: LEFT UPPER ARM
LOCATION SIMPLE: RIGHT LOWER BACK
LOCATION SIMPLE: LEFT UPPER BACK
LOCATION SIMPLE: CHEST
LOCATION SIMPLE: LEFT FOREHEAD

## 2021-01-13 ASSESSMENT — LOCATION ZONE DERM
LOCATION ZONE: FACE
LOCATION ZONE: EAR
LOCATION ZONE: TRUNK
LOCATION ZONE: ARM

## 2021-01-13 ASSESSMENT — LOCATION DETAILED DESCRIPTION DERM
LOCATION DETAILED: RIGHT MEDIAL INFERIOR CHEST
LOCATION DETAILED: RIGHT SUPERIOR LATERAL MIDBACK
LOCATION DETAILED: LEFT SCAPHA
LOCATION DETAILED: LEFT PROXIMAL DORSAL FOREARM
LOCATION DETAILED: LEFT MEDIAL SUPERIOR CHEST
LOCATION DETAILED: LEFT INFERIOR UPPER BACK
LOCATION DETAILED: LEFT SUPERIOR HELIX
LOCATION DETAILED: LEFT FOREHEAD
LOCATION DETAILED: LEFT DISTAL POSTERIOR UPPER ARM
LOCATION DETAILED: LEFT ANTIHELIX
LOCATION DETAILED: LEFT SUPERIOR CRUS OF ANTIHELIX

## 2021-01-13 NOTE — PROCEDURE: LIQUID NITROGEN
Post-Care Instructions: I reviewed with the patient in detail post-care instructions. Patient is to wear sunprotection, and avoid picking at any of the treated lesions. Pt may apply Vaseline to crusted or scabbing areas.
Consent: The patient's consent was obtained including but not limited to risks of crusting, scabbing, blistering, scarring, darker or lighter pigmentary change, recurrence, incomplete removal and infection.
Detail Level: Detailed
Render Note In Bullet Format When Appropriate: No
Number Of Freeze-Thaw Cycles: 2 freeze-thaw cycles
Duration Of Freeze Thaw-Cycle (Seconds): 3

## 2021-01-14 DIAGNOSIS — Z23 NEED FOR VACCINATION: ICD-10-CM

## 2021-02-10 ENCOUNTER — APPOINTMENT (RX ONLY)
Dept: URBAN - METROPOLITAN AREA CLINIC 22 | Facility: CLINIC | Age: 86
Setting detail: DERMATOLOGY
End: 2021-02-10

## 2021-02-10 PROBLEM — C44.519 BASAL CELL CARCINOMA OF SKIN OF OTHER PART OF TRUNK: Status: ACTIVE | Noted: 2021-02-10

## 2021-02-10 PROCEDURE — 17262 DSTRJ MAL LES T/A/L 1.1-2.0: CPT | Mod: 76

## 2021-02-10 PROCEDURE — 17262 DSTRJ MAL LES T/A/L 1.1-2.0: CPT

## 2021-02-10 PROCEDURE — ? CURETTAGE AND DESTRUCTION

## 2021-02-10 NOTE — PROCEDURE: CURETTAGE AND DESTRUCTION
Detail Level: Detailed
Biopsy Photograph Reviewed: Yes
Number Of Curettages: 3
Size Of Lesion In Cm: 1
Size Of Lesion After Curettage: 1.3
Add Intralesional Injection: No
Anesthesia Type: 1% lidocaine with epinephrine
Cautery Type: electrodesiccation
What Was Performed First?: Curettage
Additional Information: (Optional): The wound was cleaned, and a pressure dressing was applied.  The patient received detailed post-op instructions.
Consent was obtained from the patient. The risks, benefits and alternatives to therapy were discussed in detail. Specifically, the risks of infection, scarring, bleeding, prolonged wound healing, nerve injury, incomplete removal, allergy to anesthesia and recurrence were addressed. Alternatives to ED&C, such as: surgical removal and XRT were also discussed.  Prior to the procedure, the treatment site was clearly identified and confirmed by the patient. All components of Universal Protocol/PAUSE Rule completed.
Post-Care Instructions: I reviewed with the patient in detail post-care instructions. Patient is to keep the area dry for 48 hours, and not to engage in any swimming until the area is healed. Should the patient develop any fevers, chills, bleeding, severe pain patient will contact the office immediately.
Bill As A Line Item Or As Units: Line Item
Size Of Lesion In Cm: 0.8
Size Of Lesion After Curettage: 1.1

## 2021-02-22 ENCOUNTER — APPOINTMENT (RX ONLY)
Dept: URBAN - METROPOLITAN AREA CLINIC 22 | Facility: CLINIC | Age: 86
Setting detail: DERMATOLOGY
End: 2021-02-22

## 2021-02-22 DIAGNOSIS — S0182XA OPEN WOUND OF OTHER AND MULTIPLE SITES OF FACE, COMPLICATED: ICD-10-CM

## 2021-02-22 DIAGNOSIS — Z48.817 ENCOUNTER FOR SURGICAL AFTERCARE FOLLOWING SURGERY ON THE SKIN AND SUBCUTANEOUS TISSUE: ICD-10-CM

## 2021-02-22 PROBLEM — S21.109A UNSPECIFIED OPEN WOUND OF UNSPECIFIED FRONT WALL OF THORAX WITHOUT PENETRATION INTO THORACIC CAVITY, INITIAL ENCOUNTER: Status: ACTIVE | Noted: 2021-02-22

## 2021-02-22 PROBLEM — S31.109A UNSPECIFIED OPEN WOUND OF ABDOMINAL WALL, UNSPECIFIED QUADRANT WITHOUT PENETRATION INTO PERITONEAL CAVITY, INITIAL ENCOUNTER: Status: ACTIVE | Noted: 2021-02-22

## 2021-02-22 PROCEDURE — ? PRESCRIPTION

## 2021-02-22 PROCEDURE — ? COUNSELING

## 2021-02-22 PROCEDURE — 99213 OFFICE O/P EST LOW 20 MIN: CPT

## 2021-02-22 PROCEDURE — ? POST-OP WOUND CHECK

## 2021-02-22 RX ORDER — DOXYCYCLINE HYCLATE 100 MG/1
1 CAPSULE, GELATIN COATED ORAL BID
Qty: 14 | Refills: 0 | Status: ERX | COMMUNITY
Start: 2021-02-22

## 2021-02-22 RX ORDER — DOXYCYCLINE HYCLATE 100 MG/1
CAPSULE, GELATIN COATED ORAL BID
Qty: 14 | Refills: 0

## 2021-02-22 RX ADMIN — DOXYCYCLINE HYCLATE 1: 100 CAPSULE, GELATIN COATED ORAL at 00:00

## 2021-02-22 ASSESSMENT — LOCATION SIMPLE DESCRIPTION DERM
LOCATION SIMPLE: ABDOMEN
LOCATION SIMPLE: CHEST

## 2021-02-22 ASSESSMENT — LOCATION DETAILED DESCRIPTION DERM
LOCATION DETAILED: EPIGASTRIC SKIN
LOCATION DETAILED: LEFT MEDIAL SUPERIOR CHEST

## 2021-02-22 ASSESSMENT — LOCATION ZONE DERM: LOCATION ZONE: TRUNK

## 2021-02-22 NOTE — PROCEDURE: POST-OP WOUND CHECK
Detail Level: Detailed
Add 78315 Cpt? (Important Note: In 2017 The Use Of 51997 Is Being Tracked By Cms To Determine Future Global Period Reimbursement For Global Periods): no

## 2021-02-22 NOTE — HPI: SURGICAL COMPLICATION (INFECTION)
How Severe Is It?: mild
Is This A New Presentation, Or A Follow-Up?: Wound Infection
When Was Your Surgical Procedure?: 02/10/21

## 2021-08-30 ENCOUNTER — APPOINTMENT (RX ONLY)
Dept: URBAN - METROPOLITAN AREA CLINIC 22 | Facility: CLINIC | Age: 86
Setting detail: DERMATOLOGY
End: 2021-08-30

## 2021-08-30 DIAGNOSIS — D18.0 HEMANGIOMA: ICD-10-CM

## 2021-08-30 DIAGNOSIS — D22 MELANOCYTIC NEVI: ICD-10-CM

## 2021-08-30 DIAGNOSIS — L81.4 OTHER MELANIN HYPERPIGMENTATION: ICD-10-CM

## 2021-08-30 DIAGNOSIS — Z85.828 PERSONAL HISTORY OF OTHER MALIGNANT NEOPLASM OF SKIN: ICD-10-CM

## 2021-08-30 DIAGNOSIS — L82.1 OTHER SEBORRHEIC KERATOSIS: ICD-10-CM

## 2021-08-30 DIAGNOSIS — L57.0 ACTINIC KERATOSIS: ICD-10-CM

## 2021-08-30 DIAGNOSIS — Z71.89 OTHER SPECIFIED COUNSELING: ICD-10-CM

## 2021-08-30 PROBLEM — D18.01 HEMANGIOMA OF SKIN AND SUBCUTANEOUS TISSUE: Status: ACTIVE | Noted: 2021-08-30

## 2021-08-30 PROBLEM — D22.5 MELANOCYTIC NEVI OF TRUNK: Status: ACTIVE | Noted: 2021-08-30

## 2021-08-30 PROCEDURE — ? SUNSCREEN RECOMMENDATIONS

## 2021-08-30 PROCEDURE — 17000 DESTRUCT PREMALG LESION: CPT

## 2021-08-30 PROCEDURE — ? COUNSELING

## 2021-08-30 PROCEDURE — ? LIQUID NITROGEN

## 2021-08-30 PROCEDURE — 99213 OFFICE O/P EST LOW 20 MIN: CPT | Mod: 25

## 2021-08-30 PROCEDURE — 17003 DESTRUCT PREMALG LES 2-14: CPT

## 2021-08-30 ASSESSMENT — LOCATION DETAILED DESCRIPTION DERM
LOCATION DETAILED: LEFT SUPERIOR MEDIAL FOREHEAD
LOCATION DETAILED: RIGHT MID-UPPER BACK
LOCATION DETAILED: LEFT DISTAL POSTERIOR UPPER ARM
LOCATION DETAILED: LEFT MEDIAL SUPERIOR CHEST
LOCATION DETAILED: RIGHT NASAL ALA
LOCATION DETAILED: RIGHT PROXIMAL DORSAL FOREARM
LOCATION DETAILED: LEFT NASAL ALA
LOCATION DETAILED: LEFT PROXIMAL DORSAL FOREARM
LOCATION DETAILED: LEFT LATERAL ABDOMEN
LOCATION DETAILED: RIGHT INFERIOR LATERAL FOREHEAD
LOCATION DETAILED: NASAL SUPRATIP
LOCATION DETAILED: LEFT SUPERIOR FOREHEAD
LOCATION DETAILED: LEFT INFERIOR UPPER BACK
LOCATION DETAILED: PERIUMBILICAL SKIN
LOCATION DETAILED: RIGHT SUPERIOR PREAURICULAR CHEEK
LOCATION DETAILED: RIGHT PROXIMAL POSTERIOR UPPER ARM

## 2021-08-30 ASSESSMENT — LOCATION SIMPLE DESCRIPTION DERM
LOCATION SIMPLE: LEFT NOSE
LOCATION SIMPLE: CHEST
LOCATION SIMPLE: NOSE
LOCATION SIMPLE: LEFT FOREHEAD
LOCATION SIMPLE: LEFT UPPER ARM
LOCATION SIMPLE: RIGHT UPPER ARM
LOCATION SIMPLE: RIGHT CHEEK
LOCATION SIMPLE: RIGHT FOREHEAD
LOCATION SIMPLE: LEFT FOREARM
LOCATION SIMPLE: LEFT UPPER BACK
LOCATION SIMPLE: ABDOMEN
LOCATION SIMPLE: RIGHT NOSE
LOCATION SIMPLE: RIGHT FOREARM
LOCATION SIMPLE: RIGHT UPPER BACK

## 2021-08-30 ASSESSMENT — LOCATION ZONE DERM
LOCATION ZONE: NOSE
LOCATION ZONE: TRUNK
LOCATION ZONE: ARM
LOCATION ZONE: FACE

## 2021-08-30 NOTE — PROCEDURE: LIQUID NITROGEN
Render Note In Bullet Format When Appropriate: No
Consent: The patient's consent was obtained including but not limited to risks of crusting, scabbing, blistering, scarring, darker or lighter pigmentary change, recurrence, incomplete removal and infection.
Show Aperture Variable?: Yes
Detail Level: Detailed
Post-Care Instructions: I reviewed with the patient in detail post-care instructions. Patient is to wear sunprotection, and avoid picking at any of the treated lesions. Pt may apply Vaseline to crusted or scabbing areas.
Duration Of Freeze Thaw-Cycle (Seconds): 3
Number Of Freeze-Thaw Cycles: 2 freeze-thaw cycles

## 2022-01-01 ENCOUNTER — APPOINTMENT (RX ONLY)
Dept: URBAN - METROPOLITAN AREA CLINIC 22 | Facility: CLINIC | Age: 87
Setting detail: DERMATOLOGY
End: 2022-01-01

## 2022-01-01 DIAGNOSIS — Z71.89 OTHER SPECIFIED COUNSELING: ICD-10-CM

## 2022-01-01 DIAGNOSIS — Z85.828 PERSONAL HISTORY OF OTHER MALIGNANT NEOPLASM OF SKIN: ICD-10-CM

## 2022-01-01 DIAGNOSIS — L84 CORNS AND CALLOSITIES: ICD-10-CM

## 2022-01-01 PROCEDURE — ? REFERRAL CORRESPONDENCE

## 2022-01-01 PROCEDURE — ? COUNSELING

## 2022-01-01 PROCEDURE — ? BENIGN DESTRUCTION

## 2022-01-01 PROCEDURE — ? ADDITIONAL NOTES

## 2022-01-01 PROCEDURE — ? SUNSCREEN RECOMMENDATIONS

## 2022-01-01 PROCEDURE — 99213 OFFICE O/P EST LOW 20 MIN: CPT | Mod: 25

## 2022-01-01 PROCEDURE — 17110 DESTRUCTION B9 LES UP TO 14: CPT

## 2022-01-01 ASSESSMENT — LOCATION SIMPLE DESCRIPTION DERM
LOCATION SIMPLE: RIGHT PLANTAR SURFACE
LOCATION SIMPLE: LEFT UPPER ARM
LOCATION SIMPLE: CHEST
LOCATION SIMPLE: ABDOMEN

## 2022-01-01 ASSESSMENT — LOCATION DETAILED DESCRIPTION DERM
LOCATION DETAILED: LEFT MEDIAL SUPERIOR CHEST
LOCATION DETAILED: LEFT DISTAL POSTERIOR UPPER ARM
LOCATION DETAILED: PERIUMBILICAL SKIN
LOCATION DETAILED: RIGHT PLANTAR FOREFOOT OVERLYING 3RD METATARSAL

## 2022-01-01 ASSESSMENT — LOCATION ZONE DERM
LOCATION ZONE: FEET
LOCATION ZONE: TRUNK
LOCATION ZONE: ARM

## 2022-01-25 PROBLEM — Z85.828 PERSONAL HISTORY OF OTHER MALIGNANT NEOPLASM OF SKIN: Status: ACTIVE | Noted: 2022-01-01

## 2022-01-25 PROBLEM — K13.70 UNSPECIFIED LESIONS OF ORAL MUCOSA: Status: ACTIVE | Noted: 2022-01-01

## 2022-01-25 PROBLEM — L84 CORNS AND CALLOSITIES: Status: ACTIVE | Noted: 2022-01-01

## 2022-01-25 PROBLEM — Z71.89 OTHER SPECIFIED COUNSELING: Status: ACTIVE | Noted: 2022-01-01

## 2022-01-25 NOTE — PROCEDURE: BENIGN DESTRUCTION
Include Z78.9 (Other Specified Conditions Influencing Health Status) As An Associated Diagnosis?: No
Treatment Number (Will Not Render If 0): 0
Detail Level: Detailed
Post-Care Instructions: I reviewed with the patient in detail post-care instructions. Patient is to wear sunprotection, and avoid picking at any of the treated lesions. Pt may apply Vaseline to crusted or scabbing areas.
Consent: The patient's consent was obtained including but not limited to risks of crusting, scabbing, blistering, scarring, darker or lighter pigmentary change, recurrence, incomplete removal and infection.
Medical Necessity Information: It is in your best interest to select a reason for this procedure from the list below. All of these items fulfill various CMS LCD requirements except the new and changing color options.
Medical Necessity Clause: This procedure was medically necessary because the lesions that were treated were:
Anesthesia Volume In Cc: 1

## 2022-01-25 NOTE — HPI: SKIN LESION
Is This A New Presentation, Or A Follow-Up?: Growth
What Type Of Note Output Would You Prefer (Optional)?: Standard Output
How Severe Is Your Skin Lesion?: moderate
Has Your Skin Lesion Been Treated?: not been treated
Is This A New Presentation, Or A Follow-Up?: Skin Lesion
How Severe Is Your Skin Lesion?: mild

## 2022-03-07 ENCOUNTER — HOSPITAL ENCOUNTER (OUTPATIENT)
Facility: MEDICAL CENTER | Age: 87
End: 2022-03-07
Attending: OTOLARYNGOLOGY | Admitting: OTOLARYNGOLOGY
Payer: MEDICARE

## 2022-07-26 ENCOUNTER — HOSPITAL ENCOUNTER (OUTPATIENT)
Dept: RADIOLOGY | Facility: MEDICAL CENTER | Age: 87
End: 2022-07-26
Attending: FAMILY MEDICINE

## 2022-07-26 ENCOUNTER — DOCUMENTATION (OUTPATIENT)
Dept: RADIATION ONCOLOGY | Facility: MEDICAL CENTER | Age: 87
End: 2022-07-26
Payer: MEDICARE

## 2022-07-26 ENCOUNTER — HOSPITAL ENCOUNTER (OUTPATIENT)
Dept: RADIATION ONCOLOGY | Facility: MEDICAL CENTER | Age: 87
End: 2022-07-31
Attending: RADIOLOGY
Payer: MEDICARE

## 2022-07-26 VITALS
WEIGHT: 184 LBS | BODY MASS INDEX: 26.34 KG/M2 | OXYGEN SATURATION: 94 % | RESPIRATION RATE: 20 BRPM | HEART RATE: 61 BPM | SYSTOLIC BLOOD PRESSURE: 127 MMHG | HEIGHT: 70 IN | DIASTOLIC BLOOD PRESSURE: 63 MMHG | TEMPERATURE: 97.6 F

## 2022-07-26 DIAGNOSIS — C02.3 CANCER OF ANTERIOR TWO-THIRDS OF TONGUE (HCC): ICD-10-CM

## 2022-07-26 PROCEDURE — 31575 DIAGNOSTIC LARYNGOSCOPY: CPT | Performed by: RADIOLOGY

## 2022-07-26 PROCEDURE — 99205 OFFICE O/P NEW HI 60 MIN: CPT | Mod: 25 | Performed by: RADIOLOGY

## 2022-07-26 PROCEDURE — 99214 OFFICE O/P EST MOD 30 MIN: CPT | Mod: 25 | Performed by: RADIOLOGY

## 2022-07-26 RX ORDER — GABAPENTIN 100 MG/1
100 CAPSULE ORAL 3 TIMES DAILY
COMMUNITY

## 2022-07-26 ASSESSMENT — PAIN SCALES - GENERAL: PAINLEVEL: 6=MODERATE PAIN

## 2022-07-26 NOTE — CONSULTS
RADIATION ONCOLOGY CONSULT    Patient name:  Lamont Lynch    Primary Physician:  Sean Jurado M.D. (Inactive) MRN: [id number]  [facility]: [id number]   Referring physician:  Michelle Rodriguez M.D.  : 1930, 89 y.o.     DATE OF SERVICE:   2022    IDENTIFICATION:   A 89 y.o. male with  Visit Diagnoses     ICD-10-CM   1. 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 on 2022  Histopathologic type: Squamous cell carcinoma, NOS  Stage prefix: Initial diagnosis  Histologic grade (G): G1  Histologic grading system: 3 grade system  Laterality: Right      He is being seen in consultation at the kind request of Michelle Pacheco M.D.     HISTORY OF PRESENT ILLNESS:   Subjective    89-year-old male with 50-year tobacco history stopped in his 70s.  He presented with a rapidly enlarging painful right tongue lesion in April.  Was evaluated by Dr. Rodriguez and underwent a right franny-glossectomy.  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  Depth of invasion was 22mm.  Margins were clear but closest margin could not be determined.    He decided in deference to his age to forego adjuvant therapy.  Unsurprisingly, he developed a right neck mass approximately 2-1/2 months postop.  Mass began to increase in size.  He underwent CT soft tissue neck on 2022 demonstrating a 5 cm right neck mass and enlarged level 1B lymph nodes.  CT chest did not demonstrate any metastatic disease.    Currently he is complaining of pain involving the right neck.  He is taking narcotic prescribed by Dr Rodriguez.  His overall performance status is fair.  He is quite sedentary.  Does no housework.  Watches TV.        PAST MEDICAL HISTORY:   Past  Medical History:   Diagnosis Date   • Arrhythmia    • Arthritis    • Basal cell carcinoma of brow 9/2/2014    Referred to dermatologist Dr. Mcdowell    • BMI 31.0-31.9,adult 2/25/2015   • CAD (coronary artery disease)    • Cataract    • GERD (gastroesophageal reflux disease) 2/25/2015   • Hyperlipidemia 12/1/2011   • Hypertension    • Stented coronary artery        PAST SURGICAL HISTORY:  Past Surgical History:   Procedure Laterality Date   • ESOPHAGOSCOPY N/A 2/21/2018    Procedure: ESOPHAGOSCOPY- BIOPSY AS NEEDED;  Surgeon: Allan Latham M.D.;  Location: SURGERY SAME DAY [facility];  Service: Ent   • LESION EXCISION GENERAL  2/21/2018    Procedure: LESION EXCISION GENERAL- TONGUE ;  Surgeon: Allan Latham M.D.;  Location: SURGERY SAME DAY [facility];  Service: Ent   • LARYNGOSCOPY N/A 2/21/2018    Procedure: LARYNGOSCOPY- DIRECT;  Surgeon: Allan Latham M.D.;  Location: SURGERY SAME DAY [facility];  Service: Ent   • CATARACT PHACO WITH IOL  9/23/2014    Performed by Fei Zapata M.D. at [facility]   • CATARACT PHACO WITH IOL  8/26/2014    Performed by Fei Zapata M.D. at SUR[facility]ERY [facility]   • ANGIOPLASTY BALLOON     • APPENDECTOMY     • EYE SURGERY         CURRENT MEDICATIONS:  Current Outpatient Medications   Medication Sig Dispense Refill   • gabapentin (NEURONTIN) 100 MG Cap Take 100 mg by mouth 3 times a day.     • clopidogrel (PLAVIX) 75 MG Tab Take 1 tablet by mouth once daily 30 Tab 0   • pravastatin (PRAVACHOL) 10 MG Tab Take 10 mg by mouth every evening.     • ezetimibe (ZETIA) 10 MG Tab TAKE 1 TABLET BY MOUTH ONCE DAILY 90 Tab 1   • losartan (COZAAR) 100 MG Tab TAKE 1 TABLET BY MOUTH ONCE DAILY 90 Tab 1   • Non Formulary Request Systane eye gtts, besivance eye gtts, tradnisolone eye gtts, ketorolac eye gtts two to three times a day     • nitroglycerin (NITROSTAT) 0.4 MG SUBL Place 0.4 mg under tongue every 5 minutes as needed.     • omeprazole (PRILOSEC) 20  "MG CPDR One 1/2 hr before dinner 30 Cap 0   • Multiple Vitamins-Minerals (CENTRUM SILVER PO) Take  by mouth every day.     • docosahexanoic acid (OMEGA 3 FA) 1000 MG Cap Take 1,000 mg by mouth 2 Times a Day.     • Homeopathic Products (SIMILASAN ALLERGY EYE RELIEF OP) by Ophthalmic route 3 times a day.     • furosemide (LASIX) 20 MG Tab Take 20 mg by mouth 2 times a day. (Patient not taking: Reported on 7/26/2022)     • amLODIPine (NORVASC) 2.5 MG Tab TAKE 1 TABLET BY MOUTH ONCE DAILY (Patient not taking: Reported on 7/26/2022) 90 Tab 1     No current facility-administered medications for this encounter.       ALLERGIES:  Nkda [no known drug allergy]    FAMILY HISTORY:    family history includes Other in his mother; Stroke in his father.    SOCIAL HISTORY:     reports that he quit smoking about 49 years ago. His smoking use included cigarettes. He has a 10.00 pack-year smoking history. He has never used smokeless tobacco. He reports that he does not drink alcohol and does not use drugs.   Patient currently resides in [place] with his wife Rajwinder.  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    REVIEW OF SYSTEMS:    A complete review of system taken. Pertinent items in HPI.  All others negative.    PHYSICAL EXAM:   PERFORMANCE STATUS:  ECOG Performance Review 7/26/2022   ECOG Performance Status Ambulatory and capable of all selfcare but unable to carry out any work activities.  Up and about more than 50% of waking hours   Some recent data might be hidden     Karnofsky Score 7/26/2022   Karnofsky Score 60   Some recent data might be hidden     /63   Pulse 61   Temp 36.4 °C (97.6 °F)   Resp 20   Ht 1.778 m (5' 10\")   Wt 83.5 kg (184 lb)   SpO2 94%   BMI 26.40 kg/m²   Physical Exam  Constitutional:       General: He is not in acute distress.     Appearance: Normal appearance.   HENT:      Head: Normocephalic and atraumatic.      Mouth/Throat:      Mouth: Mucous " membranes are moist.      Pharynx: Oropharynx is clear. No oropharyngeal exudate or posterior oropharyngeal erythema.   Eyes:      Extraocular Movements: Extraocular movements intact.      Conjunctiva/sclera: Conjunctivae normal.      Pupils: Pupils are equal, round, and reactive to light.   Cardiovascular:      Rate and Rhythm: Normal rate and regular rhythm.      Pulses: Normal pulses.   Pulmonary:      Effort: Pulmonary effort is normal.   Abdominal:      General: Abdomen is flat.      Palpations: Abdomen is soft.   Musculoskeletal:         General: Normal range of motion.      Cervical back: Normal range of motion and neck supple.   Lymphadenopathy:      Cervical: Cervical adenopathy (6 x 6 cm firm fixed right low neck node with overlying skin erythema threatening to ulcerate.) present.   Skin:     General: Skin is dry.      Findings: No erythema.   Neurological:      General: No focal deficit present.      Mental Status: He is alert.      Cranial Nerves: No cranial nerve deficit.      Sensory: No sensory deficit.   Psychiatric:         Mood and Affect: Mood normal.         Behavior: Behavior normal.         Thought Content: Thought content normal.         Judgment: Judgment normal.         FLEXIBLE FIBEROPTIC EXAM:  Normal exam      LABORATORY DATA:   Lab Results   Component Value Date/Time    WBC 5.0 06/14/2018 09:21 AM    RBC 3.82 (L) 12/26/2019 08:25 AM    RBC 3.80 (L) 06/14/2018 09:21 AM    HEMOGLOBIN 12.9 (L) 12/26/2019 08:25 AM    HEMOGLOBIN 12.7 (L) 06/14/2018 09:21 AM    HEMATOCRIT 39.3 12/26/2019 08:25 AM    HEMATOCRIT 38.8 (L) 06/14/2018 09:21 AM    .8 (H) 12/26/2019 08:25 AM    .1 (H) 06/14/2018 09:21 AM    MCH 33.8 12/26/2019 08:25 AM    MCH 33.4 (H) 06/14/2018 09:21 AM    MCHC 32.8 (L) 12/26/2019 08:25 AM    MCHC 32.7 (L) 06/14/2018 09:21 AM    RDW 14.1 12/26/2019 08:25 AM    RDW 50.7 (H) 06/14/2018 09:21 AM    PLATELETCT 161 12/26/2019 08:25 AM    PLATELETCT 144 (L) 06/14/2018 09:21  AM    MPV 8.9 12/26/2019 08:25 AM    MPV 10.8 06/14/2018 09:21 AM    NEUTSPOLYS 58 12/26/2019 08:25 AM    NEUTSPOLYS 56.20 06/14/2018 09:21 AM    LYMPHOCYTES 31 12/26/2019 08:25 AM    LYMPHOCYTES 32.50 06/14/2018 09:21 AM    MONOCYTES 8 12/26/2019 08:25 AM    MONOCYTES 8.70 06/14/2018 09:21 AM    EOSINOPHILS 3 12/26/2019 08:25 AM    EOSINOPHILS 1.80 06/14/2018 09:21 AM    BASOPHILS 0 12/26/2019 08:25 AM    BASOPHILS 0.60 06/14/2018 09:21 AM    ANISOCYTOSIS 1+ 08/23/2017 10:01 AM      Lab Results   Component Value Date/Time    SODIUM 140 07/15/2019 08:56 AM    POTASSIUM 4.1 07/15/2019 08:56 AM    CHLORIDE 107 07/15/2019 08:56 AM    CO2 24 07/15/2019 08:56 AM    GLUCOSE 112 (H) 07/15/2019 08:56 AM    BUN 43 (H) 07/15/2019 08:56 AM    CREATININE 2.05 (H) 07/15/2019 08:56 AM           RADIOLOGY DATA:  CT Soft Tissue Neck with Contrast    Result Date: 7/11/2022  4.8 CM RIGHT NECK MASS HIGHLY SUSPICIOUS FOR MALIGNANCY.    CT Chest without Contrast    Result Date: 7/11/2022  THE BONES ARE NORMAL.  THERE ARE NO SCLEROTIC LESIONS IN THE RIB OR RIGHT SCAPULA.  CHEST X-RAY FINDING REPRESENTS AN ARTIFACT. 4.7 CM RIGHT NECK MASS HIGHLY SUSPICIOUS FOR MALIGNANCY.    X-ray Chest 2 View    Result Date: 6/22/2022  NO ACUTE CARDIO POORLY FINDINGS. CHRONIC LEFT LOWER PLEURAL SCAR. POSSIBLE SCLEROTIC LESION WITHIN THE ANTERIOR RIGHT SECOND RIB OR SCAPULA WHICH CAN BE SEEN WITH MALIGNANCY. CT SCAN OF THE CHEST MAY BE HELPFUL FOR FURTHER EVALUATION.      IMPRESSION:    A 89 y.o. with  Visit Diagnoses     ICD-10-CM   1. 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 on 7/26/2022  Histopathologic type: Squamous cell carcinoma, NOS  Stage prefix: Initial diagnosis  Histologic grade (G): G1  Histologic grading system: 3 grade system  Laterality:  Right        RECOMMENDATIONS:   89-year-old male with recurrent tongue cancer involving the neck.  He has a large fixed neck mass with overlying skin erythema threatening to ulcerate the skin.  He previously approximately 3 months earlier had resection of a large 6 cm right tongue mass and underwent a hemiglossectomy.  Surprisingly he has no obvious local recurrence of disease.  He does not appear to have metastatic disease at the current time.    Previously discussed the case with Dr. Rodriguez.  Also reviewed case at our radiation conference.  Considering his age and performance status, I did do not think he will be able to tolerate chemoradiotherapy.  I also do not feel he will be able to tolerate comprehensive radiotherapy which would involve treating bilateral neck and the oral cavity.  Considering the pace of disease I feel more appropriate option would be palliative treatment targeting the known lymphatic disease.  He will still probably experience some pharyngitis and nutritional issues.  We will have oncology nutritionist get involved in patient's case.  We will also have nurse navigation involved to address any logistical issues.    Recommended quad shot radiotherapy which would involve 4 treatments given over 2 days with a 2-week break followed by 2 additional quad shot cycles.  Response rate should be excellent 70 to 80%.  Durability of response at least 6 months to a year.     He and his wife are in agreement to the quad shot treatment.  He will return for simulation after authorization obtained from insurance.  Anticipate start of treatment approximately week after simulation.     Orders Placed This Encounter   • REFERRAL TO ONCOLOGY NURSE NAVIGATOR   • Referral to Oncology Nutrition Services   • REFERRAL TO ONCOLOGY NURSE NAVIGATOR   • gabapentin (NEURONTIN) 100 MG Cap

## 2022-07-26 NOTE — PROGRESS NOTES
Nutrition Services: [place] for Cancer Referral  Lamont Lynch is a 89 y.o. male with diagnosis of cancer of anterior two thirds of tongue: Stage BRANDI: cT4a, cN2b, cM0  .Referral received for nutrition services. Due to high volume of referrals, please allow for up to 14 days for initial RD consultation per policy. RD will attempt to see at next oncology appointment or will contact per policy for nutrition assessment.    Please contact as needed.  [phone]
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
## 2022-07-26 NOTE — PROGRESS NOTES
"Patient was seen today in clinic with Dr. Wright for consult.  Vitals signs and weight were obtained and pain assessment was completed.  Allergies and medications were reviewed with the patient.       Vitals/Pain:  Vitals:    07/26/22 1346   BP: 127/63   Pulse: 61   Resp: 20   Temp: 36.4 °C (97.6 °F)   SpO2: 94%   Weight: 83.5 kg (184 lb)   Height: 1.778 m (5' 10\")   Pain Score: 6=Moderate Pain        Allergies:   Nkda [no known drug allergy]    Current Medications:  Current Outpatient Medications   Medication Sig Dispense Refill   • gabapentin (NEURONTIN) 100 MG Cap Take 100 mg by mouth 3 times a day.     • clopidogrel (PLAVIX) 75 MG Tab Take 1 tablet by mouth once daily 30 Tab 0   • pravastatin (PRAVACHOL) 10 MG Tab Take 10 mg by mouth every evening.     • ezetimibe (ZETIA) 10 MG Tab TAKE 1 TABLET BY MOUTH ONCE DAILY 90 Tab 1   • losartan (COZAAR) 100 MG Tab TAKE 1 TABLET BY MOUTH ONCE DAILY 90 Tab 1   • Non Formulary Request Systane eye gtts, besivance eye gtts, tradnisolone eye gtts, ketorolac eye gtts two to three times a day     • nitroglycerin (NITROSTAT) 0.4 MG SUBL Place 0.4 mg under tongue every 5 minutes as needed.     • omeprazole (PRILOSEC) 20 MG CPDR One 1/2 hr before dinner 30 Cap 0   • Multiple Vitamins-Minerals (CENTRUM SILVER PO) Take  by mouth every day.     • docosahexanoic acid (OMEGA 3 FA) 1000 MG Cap Take 1,000 mg by mouth 2 Times a Day.     • Homeopathic Products (SIMILASAN ALLERGY EYE RELIEF OP) by Ophthalmic route 3 times a day.     • furosemide (LASIX) 20 MG Tab Take 20 mg by mouth 2 times a day. (Patient not taking: Reported on 7/26/2022)     • amLODIPine (NORVASC) 2.5 MG Tab TAKE 1 TABLET BY MOUTH ONCE DAILY (Patient not taking: Reported on 7/26/2022) 90 Tab 1     No current facility-administered medications for this encounter.         PCP:  Rhian[facility] (Inactive)        Zahra Ramesh R.N.  "

## 2022-07-26 NOTE — PROCEDURES
DATE OF SERVICE: 7/26/2022         FIBEROPTIC NASO-PHARYNGOSCOPY NOTE      Flexible fiberoptic exam was performed after anesthesia of left nostril and oropharynx.    Nasopharynx:       R. Eustachian canal opening, torus, fossa of Rosenmuller appear normal  L. Eustachian canal opening, torus, fossa of Rosenmuller appear normal      Oropharynx:        Base of tongue normal appearing.  Vallecula normal appearing.  Epiglottis normal appearing.  PE folds normal appearing.    Larynx:      R. AE fold, false vocal fold, arytenoid appear normal  L. AE fold, false vocal fold, arytenoid appear normal  R. Cord mobile  L. Cord mobile   R. Piriform sinus appears normal  L. Piriform sinus appears normal      Vahe HSU M.D.  Electronically signed by: Vahe HSU M.D., 7/26/2022 3:17 PM  [phone]

## 2022-08-09 ENCOUNTER — DOCUMENTATION (OUTPATIENT)
Dept: RADIATION ONCOLOGY | Facility: MEDICAL CENTER | Age: 87
End: 2022-08-09
Payer: MEDICARE

## 2022-08-09 NOTE — PROGRESS NOTES
Nutrition Services: [place] for Cancer Referral  Lamont Lynch is a 89 y.o. male with diagnosis of cancer of anterior tow thirds of tongue, referral received for nutrition services. It does not appear that pt is undergoing treatment at this time. Please re-consult if pt undergoing treatment with [facility].     Please re-consult as needed.  [phone]

## 2022-08-25 ENCOUNTER — HOSPICE ADMISSION (OUTPATIENT)
Dept: HOSPICE | Facility: HOSPICE | Age: 87
End: 2022-08-25
